# Patient Record
Sex: FEMALE | Race: BLACK OR AFRICAN AMERICAN | Employment: OTHER | URBAN - METROPOLITAN AREA
[De-identification: names, ages, dates, MRNs, and addresses within clinical notes are randomized per-mention and may not be internally consistent; named-entity substitution may affect disease eponyms.]

---

## 2017-01-03 ENCOUNTER — HOSPITAL ENCOUNTER (EMERGENCY)
Age: 67
Discharge: HOME | End: 2017-01-03
Attending: EMERGENCY MEDICINE
Payer: MEDICARE

## 2017-01-03 ENCOUNTER — APPOINTMENT (OUTPATIENT)
Dept: GENERAL RADIOLOGY | Age: 67
End: 2017-01-03
Attending: PHYSICIAN ASSISTANT
Payer: MEDICARE

## 2017-01-03 VITALS
HEIGHT: 66 IN | RESPIRATION RATE: 21 BRPM | WEIGHT: 185 LBS | TEMPERATURE: 98.3 F | BODY MASS INDEX: 29.73 KG/M2 | OXYGEN SATURATION: 99 % | HEART RATE: 65 BPM | SYSTOLIC BLOOD PRESSURE: 131 MMHG | DIASTOLIC BLOOD PRESSURE: 70 MMHG

## 2017-01-03 DIAGNOSIS — S70.11XA CONTUSION OF RIGHT HIP AND THIGH, INITIAL ENCOUNTER: Primary | ICD-10-CM

## 2017-01-03 DIAGNOSIS — S40.011A CONTUSION OF RIGHT SHOULDER, INITIAL ENCOUNTER: ICD-10-CM

## 2017-01-03 DIAGNOSIS — S70.01XA CONTUSION OF RIGHT HIP AND THIGH, INITIAL ENCOUNTER: Primary | ICD-10-CM

## 2017-01-03 DIAGNOSIS — S16.1XXA NECK STRAIN, INITIAL ENCOUNTER: ICD-10-CM
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
## 2017-01-03 DIAGNOSIS — S39.012A BACK STRAIN, INITIAL ENCOUNTER: ICD-10-CM

## 2017-01-03 PROCEDURE — 74011250637 HC RX REV CODE- 250/637: Performed by: PHYSICIAN ASSISTANT

## 2017-01-03 PROCEDURE — 72100 X-RAY EXAM L-S SPINE 2/3 VWS: CPT

## 2017-01-03 PROCEDURE — 73030 X-RAY EXAM OF SHOULDER: CPT

## 2017-01-03 PROCEDURE — 73502 X-RAY EXAM HIP UNI 2-3 VIEWS: CPT

## 2017-01-03 PROCEDURE — 72040 X-RAY EXAM NECK SPINE 2-3 VW: CPT

## 2017-01-03 PROCEDURE — 99282 EMERGENCY DEPT VISIT SF MDM: CPT

## 2017-01-03 RX ORDER — TRAMADOL HYDROCHLORIDE 50 MG/1
50 TABLET ORAL
Qty: 20 TAB | Refills: 0 | Status: SHIPPED | OUTPATIENT
Start: 2017-01-03 | End: 2019-06-01

## 2017-01-03 RX ORDER — TRAMADOL HYDROCHLORIDE 50 MG/1
50 TABLET ORAL
Status: COMPLETED | OUTPATIENT
Start: 2017-01-03 | End: 2017-01-03

## 2017-01-03 RX ADMIN — TRAMADOL HYDROCHLORIDE 50 MG: 50 TABLET, FILM COATED ORAL at 16:58

## 2017-01-03 NOTE — DISCHARGE INSTRUCTIONS
Neck Strain: Care Instructions  Your Care Instructions  You have strained the muscles and ligaments in your neck. A sudden, awkward movement can strain the neck. This often occurs with falls or car accidents or during certain sports. Everyday activities like working on a computer or sleeping can also cause neck strain if they force you to hold your neck in an awkward position for a long time. It is common for neck pain to get worse for a day or two after an injury, but it should start to feel better after that. You may have more pain and stiffness for several days before it gets better. This is expected. It may take a few weeks or longer for it to heal completely. Good home treatment can help you get better faster and avoid future neck problems. Follow-up care is a key part of your treatment and safety. Be sure to make and go to all appointments, and call your doctor if you are having problems. It's also a good idea to know your test results and keep a list of the medicines you take. How can you care for yourself at home? · If you were given a neck brace (cervical collar) to limit neck motion, wear it as instructed for as many days as your doctor tells you to. Do not wear it longer than you were told to. Wearing a brace for too long can make neck stiffness worse and weaken the neck muscles. · You can try using heat or ice to see if it helps. ¨ Try using a heating pad on a low or medium setting for 15 to 20 minutes every 2 to 3 hours. Try a warm shower in place of one session with the heating pad. You can also buy single-use heat wraps that last up to 8 hours. ¨ You can also try an ice pack for 10 to 15 minutes every 2 to 3 hours. · Take pain medicines exactly as directed. ¨ If the doctor gave you a prescription medicine for pain, take it as prescribed. ¨ If you are not taking a prescription pain medicine, ask your doctor if you can take an over-the-counter medicine.   · Gently rub the area to relieve pain and help with blood flow. Do not massage the area if it hurts to do so. · Do not do anything that makes the pain worse. Take it easy for a couple of days. You can do your usual activities if they do not hurt your neck or put it at risk for more stress or injury. · Try sleeping on a special neck pillow. Place it under your neck, not under your head. Placing a tightly rolled-up towel under your neck while you sleep will also work. If you use a neck pillow or rolled towel, do not use your regular pillow at the same time. · To prevent future neck pain, do exercises to stretch and strengthen your neck and back. Learn how to use good posture, safe lifting techniques, and proper body mechanics. When should you call for help? Call 911 anytime you think you may need emergency care. For example, call if:  · You are unable to move an arm or a leg at all. Call your doctor now or seek immediate medical care if:  · You have new or worse symptoms in your arms, legs, chest, belly, or buttocks. Symptoms may include:  ¨ Numbness or tingling. ¨ Weakness. ¨ Pain. · You lose bladder or bowel control. Watch closely for changes in your health, and be sure to contact your doctor if:  · You are not getting better as expected. Where can you learn more? Go to http://thai-lesly.info/. Enter M253 in the search box to learn more about \"Neck Strain: Care Instructions. \"  Current as of: May 23, 2016  Content Version: 11.1  © 20062415-1090 [employer]. Care instructions adapted under license by [employer] (which disclaims liability or warranty for this information). If you have questions about a medical condition or this instruction, always ask your healthcare professional. [address] any warranty or liability for your use of this information.          Neck Strain or Sprain: Rehab Exercises  Your Care Instructions  Here are some examples of typical rehabilitation exercises for your condition. Start each exercise slowly. Ease off the exercise if you start to have pain. Your doctor or physical therapist will tell you when you can start these exercises and which ones will work best for you. How to do the exercises  Neck rotation    1. Sit in a firm chair, or stand up straight. 2. Keeping your chin level, turn your head to the right, and hold for 15 to 30 seconds. 3. Turn your head to the left and hold for 15 to 30 seconds. 4. Repeat 2 to 4 times to each side. Neck stretches    1. Look straight ahead, and tip your right ear to your right shoulder. Do not let your left shoulder rise up as you tip your head to the right. 2. Hold for 15 to 30 seconds. 3. Tilt your head to the left. Do not let your right shoulder rise up as you tip your head to the left. 4. Hold for 15 to 30 seconds. 5. Repeat 2 to 4 times to each side. Forward neck flexion    1. Sit in a firm chair, or stand up straight. 2. Bend your head forward. 3. Hold for 15 to 30 seconds. 4. Repeat 2 to 4 times. Lateral (side) bend strengthening    1. With your right hand, place your first two fingers on your right temple. 2. Start to bend your head to the side while using gentle pressure from your fingers to keep your head from bending. 3. Hold for about 6 seconds. 4. Repeat 8 to 12 times. 5. Switch hands and repeat the same exercise on your left side. Forward bend strengthening    1. Place your first two fingers of either hand on your forehead. 2. Start to bend your head forward while using gentle pressure from your fingers to keep your head from bending. 3. Hold for about 6 seconds. 4. Repeat 8 to 12 times. Neutral position strengthening    1. Using one hand, place your fingertips on the back of your head at the top of your neck. 2. Start to bend your head backward while using gentle pressure from your fingers to keep your head from bending. 3. Hold for about 6 seconds.   4. Repeat 8 to 12 times.  Chin tuck    1. Lie on the floor with a rolled-up towel under your neck. Your head should be touching the floor. 2. Slowly bring your chin toward your chest.  3. Hold for a count of 6, and then relax for up to 10 seconds. 4. Repeat 8 to 12 times. Follow-up care is a key part of your treatment and safety. Be sure to make and go to all appointments, and call your doctor if you are having problems. It's also a good idea to know your test results and keep a list of the medicines you take. Where can you learn more? Go to http://thai-lesly.info/. Enter M679 in the search box to learn more about \"Neck Strain or Sprain: Rehab Exercises. \"  Current as of: May 23, 2016  Content Version: 11.1  © 0078-3274 [employer]. Care instructions adapted under license by [employer] (which disclaims liability or warranty for this information). If you have questions about a medical condition or this instruction, always ask your healthcare professional. [address] any warranty or liability for your use of this information.

## 2017-01-03 NOTE — ED PROVIDER NOTES
HPI Comments: 76yoF to ED c/o right hip, right shoulder, low back, and neck pain s/p slipping on water and falling onto her right side. Pt states she was walking in a car shop and slipped on water which she did not see. Reports majority of the pain is in the right hip, which radiates down her right leg. Denies LOC, dizziness, paresthesias, CP or any other complaints nor injuries. Patient is a 77 y.o. female presenting with fall, back pain, and shoulder pain. The history is provided by the patient. Fall     Back Pain      Shoulder Pain           Past Medical History:   Diagnosis Date    Hip pain, left posterior        Past Surgical History:   Procedure Laterality Date    Hx heart catheterization  3/23/2009    Hx hysterectomy  1997         Family History:   Problem Relation Age of Onset    Diabetes Father     Cancer Father      stomach       Social History     Social History    Marital status:      Spouse name: N/A    Number of children: N/A    Years of education: N/A     Occupational History    Not on file. Social History Main Topics    Smoking status: Never Smoker    Smokeless tobacco: Not on file    Alcohol use No    Drug use: No    Sexual activity: Not on file     Other Topics Concern    Not on file     Social History Narrative         ALLERGIES: Codeine; Morphine; and Sulfa (sulfonamide antibiotics)    Review of Systems   Respiratory: Negative. Cardiovascular: Negative. Gastrointestinal: Negative. Genitourinary: Negative. Musculoskeletal: Positive for arthralgias, back pain, myalgias and neck pain. Negative for joint swelling. Neurological: Negative. All other systems reviewed and are negative. Vitals:    01/03/17 1619   BP: 131/70   Pulse: 65   Resp: 21   Temp: 98.3 °F (36.8 °C)   SpO2: 99%   Weight: 83.9 kg (185 lb)   Height: 5' 6\" (1.676 m)            Physical Exam   Constitutional: She is oriented to person, place, and time.  She appears well-developed and well-nourished. No distress. HENT:   Head: Normocephalic and atraumatic. Right Ear: Hearing and external ear normal.   Left Ear: Hearing and external ear normal.   Nose: Nose normal.   Mouth/Throat: Uvula is midline, oropharynx is clear and moist and mucous membranes are normal.   Eyes: Conjunctivae and EOM are normal. Pupils are equal, round, and reactive to light. Neck: Normal range of motion and full passive range of motion without pain. Muscular tenderness present. No spinous process tenderness present. No rigidity. No edema, no erythema and normal range of motion present. Musculoskeletal:        Right shoulder: She exhibits tenderness and pain. She exhibits normal range of motion, no bony tenderness, no swelling, no effusion, no crepitus, no deformity, no laceration, no spasm, normal pulse and normal strength. Right hip: She exhibits tenderness. She exhibits normal range of motion, normal strength, no bony tenderness, no swelling, no crepitus, no deformity and no laceration. Cervical back: She exhibits pain. She exhibits normal range of motion, no tenderness, no bony tenderness, no swelling, no edema, no deformity, no laceration and no spasm. Thoracic back: Normal.        Lumbar back: She exhibits decreased range of motion, tenderness (right paralumbar muscle tenderness) and pain. She exhibits no bony tenderness, no swelling, no edema, no deformity and no laceration. Lymphadenopathy:     She has no cervical adenopathy. Neurological: She is alert and oriented to person, place, and time. She has normal strength. No sensory deficit. She exhibits normal muscle tone. Coordination and gait normal. GCS eye subscore is 4. GCS verbal subscore is 5. GCS motor subscore is 6. Cranial nerves grossly intact   Skin: Skin is warm and dry. She is not diaphoretic. Psychiatric: She has a normal mood and affect.         MDM  Number of Diagnoses or Management Options  Back strain, initial encounter:   Contusion of right hip and thigh, initial encounter:   Contusion of right shoulder, initial encounter:   Fall from slipping on wet surface, initial encounter:   Neck strain, initial encounter:   Diagnosis management comments: Ddx: fx, sprain, strain, contusion. 66yoF slipped on wet floor and fell, landing on her right side. Xrays of right shoulder, right hip, C spine, and L spine are w/o acute process. Pt likely bruised those areas. Advised she may have increased soreness over the next few days and to use heat, rest at home, PCP f/u. Pt voiced understanding.  DAVID Issa 5:55 PM         Amount and/or Complexity of Data Reviewed  Tests in the radiology section of CPT®: ordered and reviewed    Risk of Complications, Morbidity, and/or Mortality  Presenting problems: moderate  Diagnostic procedures: moderate  Management options: low    Patient Progress  Patient progress: stable    ED Course       Procedures

## 2017-04-21 ENCOUNTER — HOSPITAL ENCOUNTER (OUTPATIENT)
Dept: PHYSICAL THERAPY | Age: 67
Discharge: HOME | End: 2017-04-21
Payer: MEDICARE

## 2017-04-21 PROCEDURE — G8979 MOBILITY GOAL STATUS: HCPCS

## 2017-04-21 PROCEDURE — 97110 THERAPEUTIC EXERCISES: CPT

## 2017-04-21 PROCEDURE — 97161 PT EVAL LOW COMPLEX 20 MIN: CPT

## 2017-04-21 PROCEDURE — G8978 MOBILITY CURRENT STATUS: HCPCS

## 2017-04-21 PROCEDURE — 97140 MANUAL THERAPY 1/> REGIONS: CPT

## 2017-04-21 NOTE — PROGRESS NOTES
[facility] - [facility]              [address]        [place], [address] [place]   [phone] fax    Plan of Care/ Statement of Necessity for Physical Therapy Services    Patient name: Delma Nguyễn Start of Care: 2017   Referral source: Tameka Palm MD : 1950    Medical Diagnosis: Lumbar pain [M54.5]  Sacroiliac pain [M53.3]   Onset Date:2017    Treatment Diagnosis: Lumbar Radiculopathy    Prior Hospitalization: see medical history Provider#: [id number]   Medications: Verified on Patient summary List    Comorbidities: Back pain   Prior Level of Function: Pt is retired; (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 The Plan of Care and following information is based on the information from the initial evaluation. Assessment/ key information: Pt is a 78 y/o female who c/o R side low back pain that radiates down R buttock, and back of thigh to knee. Pt reports pain started after a fall where she slipped on wet floor back in January. Pt reports her pain increases when she sits for too long or walks for long periods of time. Pt describes the pain as throbbing. Pt reports she has had X-rays, MRI, and EMG/NCV. Pt denies any previous back issues like this, just some normal aches/pains. Pt ambulates into clinic with normal gait. Pt shifts frequently in sitting position. Pt is TTP R L/S paraspinals, QL, and with sacral springing. Pt's L/S AROM: flex limited 50% with increase in pain, ext [facility] [place] but slight increase in pain, R SB limited 10% with increase in pain, L SB WFL but increase in pain, B rot WFL no increase in pain. Pt's B LE MMT: hip flex L 3+/5 R 3-/5, hip abd B 4-/5, hip ext L 3+/5 R 3-/5, knee ext 4/5, knee flex 4/5. Pt presents with (-) Slump B but (+) SLR and 90/90 B. Pt demos bridge through 50% range no increase in pain. Pt reports a relief with prone on elbow and no increase in pain with prone press ups.  Patient will benefit from skilled PT services to modify and progress therapeutic interventions, address functional mobility deficits, address ROM deficits, address strength deficits, analyze and address soft tissue restrictions, analyze and cue movement patterns, assess and modify postural abnormalities, address imbalance/dizziness and instruct in home and community integration to attain remaining goals. Evaluation Complexity History MEDIUM  Complexity : 1-2 comorbidities / personal factors will impact the outcome/ POC ; Examination MEDIUM Complexity : 3 Standardized tests and measures addressing body structure, function, activity limitation and / or participation in recreation  ;Presentation LOW Complexity : Stable, uncomplicated  ;Clinical Decision Making MEDIUM Complexity : FOTO score of 26-74  Overall Complexity Rating: LOW   Problem List: pain affecting function, decrease ROM, decrease strength, impaired gait/ balance, decrease ADL/ functional abilitiies, decrease activity tolerance, decrease flexibility/ joint mobility and decrease transfer abilities   Treatment Plan may include any combination of the following: Therapeutic exercise, Therapeutic activities, Neuromuscular re-education, Physical agent/modality, Gait/balance training, Manual therapy and Patient education  Patient / Family readiness to learn indicated by: asking questions, trying to perform skills and interest  Persons(s) to be included in education: patient (P)  Barriers to Learning/Limitations: None  Patient Goal (s): Relief of pain  Patient Self Reported Health Status: good  Rehabilitation Potential: good    Short Term Goals: To be accomplished in 2 weeks:  1) Pt will report (I) and compliance with HEP for home management of symptoms. 2) Pt will present with (-) SLR B for in order to perform ADL's. Long Term Goals: To be accomplished in 6 weeks:  1) Pt's FOTO score will improve > or = 60 indicating improvements in function.   2) Pt will improve L/S AROM flex, ext, B SB WFL w/o an increase in pain in order to perform ADL's.  3) Pt will improve B hip MMT > or = 4/5 for functional strength during ADL's.  4) Pt will demo bridge through full range w/o pain indicating improved core/glute strength. 5) Pt will report > or = 60% improvement in symptoms in order to progress rehab. Frequency / Duration: Patient to be seen 2 times per week for 6 weeks. Patient/ Caregiver education and instruction: Diagnosis, prognosis, exercises   [x]  Plan of care has been reviewed with PTA    G-Codes (GP)  Mobility   Current  CK= 40-59%   Goal  CJ= 20-39%    The severity rating is based on clinical judgment and the FOTO score. Certification Period: 4/21/2017-7/19/2017  Citlalli Mcnally, PT 4/21/2017 11:50 AM  ________________________________________________________________________    I certify that the above Therapy Services are being furnished while the patient is under my care. I agree with the treatment plan and certify that this therapy is necessary.     [de-identified] Signature:____________________  Date:____________Time: _________    Please sign and return to [facility] - [address], [address] [place]   [phone] fax

## 2017-04-21 NOTE — PROGRESS NOTES
PT DAILY TREATMENT NOTE - [facility]     Patient Name: Yoana Carreno  Date:2017  : 1950  [x]  Patient  Verified  [place]or: [place] MED[place] / Plan: [place] MEDICARE PART A & B / Product Type: Medicare /    In time:11:05  Out time:11:40  Total Treatment Time (min): 35  Total Timed Codes (min): 23  1:1 Treatment Time ( [address] only): 35   Visit #: 1 of 12    Treatment Area: Lumbar pain [M54.5]  Sacroiliac pain [M53.3]    SUBJECTIVE  Pain Level (0-10 scale): 3-4  Any medication changes, allergies to medications, adverse drug reactions, diagnosis change, or new procedure performed?: [x] No    [] Yes (see summary sheet for update)  Subjective functional status/changes:   [] No changes reported  Pt is a 80 y/o female who c/o R side low back pain that radiates down R buttock, and back of thigh to knee. Pt reports pain started after a fall where she slipped on wet floor back in January. Pt reports her pain increases when she sits for too long or walks for long periods of time. Pt describes the pain as throbbing. Pt reports she has had X-rays, MRI, and EMG/NCV. Pt denies any previous back issues like this, just some normal aches/pains.      OBJECTIVE    Modality rationale:  to improve the patients ability to    Min Type Additional Details    [] Estim:  []Unatt       []IFC  []Premod                        []Other:  []w/ice   []w/heat  Position:  Location:    [] Estim: []Att    []TENS instruct  []NMES                    []Other:  []w/US   []w/ice   []w/heat  Position:  Location:    []  Traction: [] Cervical       []Lumbar                       [] Prone          []Supine                       []Intermittent   []Continuous Lbs:  [] before manual  [] after manual    []  Ultrasound: []Continuous   [] Pulsed                           []1MHz   []3MHz W/cm2:  Location:    []  Iontophoresis with dexamethasone         Location: [] Take home patch   [] In clinic    []  Ice     []  heat  []  Ice massage  []  Laser   []  Anodyne Position:  Location:    []  Laser with stim  []  Other:  Position:  Location:    []  Vasopneumatic Device Pressure:       [] lo [] med [] hi   Temperature: [] lo [] med [] hi   [] Skin assessment post-treatment:  []intact []redness- no adverse reaction    []redness - adverse reaction:     12 min [x]Eval                  []Re-Eval       13 min Therapeutic Exercise:  [x] See flow sheet : Instructed, demonstrated, and performed HEP   Rationale: increase ROM, increase strength and improve coordination to improve the patients ability to decrease pain and perform ADL's     min Therapeutic Activity:  []  See flow sheet :   Rationale:   to improve the patients ability to       min Neuromuscular Re-education:  []  See flow sheet :   Rationale:   to improve the patients ability to     10 min Manual Therapy:  STM/DTM R L/S paraspinals, QL, glutes, piriformis. L/S PA and unilateral mobs. Sacral springing. Rationale: decrease pain, increase ROM, increase tissue extensibility and decrease trigger points to increase ease with ADL's     min Gait Training:  ___ feet with ___ device on level surfaces with ___ level of assist   Rationale: With   [] TE   [] TA   [] neuro   [] other: Patient Education: [x] Review HEP    [] Progressed/Changed HEP based on:   [] positioning   [] body mechanics   [] transfers   [] heat/ice application    [] other:      Other Objective/Functional Measures: Pt ambulates into clinic with normal gait. Pt shifts frequently in sitting position. Pt is TTP R L/S paraspinals, QL, and with sacral springing. Pt's L/S AROM: flex limited 50% with increase in pain, ext [facility] [place] but slight increase in pain, R SB limited 10% with increase in pain, L SB WFL but increase in pain, B rot WFL no increase in pain. Pt's B LE MMT: hip flex L 3+/5 R 3-/5, hip abd B 4-/5, hip ext L 3+/5 R 3-/5, knee ext 4/5, knee flex 4/5. Pt presents with (-) Slump B but (+) SLR and 90/90 B.  Pt demos bridge through 50% range no increase in pain. Pt reports a relief with prone on elbow and no increase in pain with prone press ups. Pain Level (0-10 scale) post treatment: 4    ASSESSMENT/Changes in Function: see POC    Patient will continue to benefit from skilled PT services to modify and progress therapeutic interventions, address functional mobility deficits, address ROM deficits, address strength deficits, analyze and address soft tissue restrictions, analyze and cue movement patterns, assess and modify postural abnormalities, address imbalance/dizziness and instruct in home and community integration to attain remaining goals. [x]  See Plan of Care  []  See progress note/recertification  []  See Discharge Summary         Progress towards goals / Updated goals:  Short term goals: to be accomplished in 2 weeks  1) Pt will report (I) and compliance with HEP for home management of symptoms. At eval: Instructed, demonstrated, and performed HEP  2) Pt will present with (-) SLR B for in order to perform ADL's. At eval: (+) SLR B  Long term goals: to be accomplished in 6 weeks  1) Pt's FOTO score will improve > or = 60 indicating improvements in function. At eval: FOTO = 44  2) Pt will improve L/S AROM flex, ext, B SB WFL w/o an increase in pain in order to perform ADL's. At eval: flex limited 50% with increase in pain, ext [facility] but slight increase in pain, R SB limited 10% with increase in pain, L SB WFL but increase in pain  3) Pt will improve B hip MMT > or = 4/5 for functional strength during ADL's. At eval: hip flex L 3+/5 R 3-/5, hip abd B 4-/5, hip ext L 3+/5 R 3-/5  4) Pt will demo bridge through full range w/o pain indicating improved core/glute strength. At eval: pt demos bridge through 50% range, no increase in pain  5) Pt will report > or = 60% improvement in symptoms in order to progress rehab.   At eval: 0%    PLAN  []  Upgrade activities as tolerated     []  Continue plan of care  []  Update interventions per flow sheet       [] Discharge due to:_  [x]  Other: 2x/week for 6 weeks      Nicola Shannon, PT 4/21/2017  11:50 AM    Future Appointments  Date Time Provider Luis Robin   5/2/2017 4:00 PM Tyrone De Oliveira [facility]   5/5/2017 10:00 AM Marika An, PTA [facility]   5/9/2017 12:00 PM Marika An, PTA [facility]   5/11/2017 1:30 PM Nasir Jaramillo  [address]   5/12/2017 10:30 AM Marika An, PTA [facility]   5/16/2017 11:00 AM Marika An, PTA [facility]   5/19/2017 11:30 AM Marika An, PTA [facility]   5/23/2017 11:00 AM Marika An, PTA [facility]   5/26/2017 9:00 AM Nicola Shannon PT [facility]   5/30/2017 9:30 AM Marika An, PTA [facility]   6/2/2017 9:30 AM Marika An, PTA [facility]

## 2017-04-26 ENCOUNTER — HOSPITAL ENCOUNTER (OUTPATIENT)
Dept: PHYSICAL THERAPY | Age: 67
Discharge: HOME | End: 2017-04-26
Payer: MEDICARE

## 2017-04-26 PROCEDURE — G8985 CARRY GOAL STATUS: HCPCS

## 2017-04-26 PROCEDURE — 97110 THERAPEUTIC EXERCISES: CPT

## 2017-04-26 PROCEDURE — 97161 PT EVAL LOW COMPLEX 20 MIN: CPT

## 2017-04-26 PROCEDURE — G8984 CARRY CURRENT STATUS: HCPCS

## 2017-04-26 PROCEDURE — 97140 MANUAL THERAPY 1/> REGIONS: CPT

## 2017-04-26 NOTE — PROGRESS NOTES
PT DAILY TREATMENT NOTE - [facility]     Patient Name: Mustapha Rockwell  Date:2017  : 1950  [x]  Patient  Verified  Payor: [place] MED[place]ARE / Plan: [place] MEDICARE PART A & B / Product Type: Medicare /    In time: 2:03  Out time:2:38  Total Treatment Time (min): 35  Total Timed Codes (min): 23  1:1 Treatment Time ( only): 35   Visit #: 1 of 12    Treatment Area: Radiculopathy, cervical region [M54.12]  Sprain of shoulder, right [S43.401A]    SUBJECTIVE  Pain Level (0-10 scale): 3  Any medication changes, allergies to medications, adverse drug reactions, diagnosis change, or new procedure performed?: [x] No    [] Yes (see summary sheet for update)  Subjective functional status/changes:   [] No changes reported  Pt is a 80 y/o female who c/o R side neck pain, R shoulder blade pain, with radicular symptoms down R UE. Pt reports pain started after a fall where she slipped on wet floor back in January. Pt reports her pain increases with prolonged driving and at night time when she tries to sleep. Pt reports numbness is in B thumbs and is pretty constant.  Pt reports she has had an MRI and MD thinks it is a pinched nerve but she has been referred to a spine specialist.     OBJECTIVE    Modality rationale:  to improve the patients ability to    Min Type Additional Details    [] Estim:  []Unatt       []IFC  []Premod                        []Other:  []w/ice   []w/heat  Position:  Location:    [] Estim: []Att    []TENS instruct  []NMES                    []Other:  []w/US   []w/ice   []w/heat  Position:  Location:    []  Traction: [] Cervical       []Lumbar                       [] Prone          []Supine                       []Intermittent   []Continuous Lbs:  [] before manual  [] after manual    []  Ultrasound: []Continuous   [] Pulsed                           []1MHz   []3MHz W/cm2:  Location:    []  Iontophoresis with dexamethasone         Location: [] Take home patch   [] In clinic    []  Ice     []  heat  [] Ice massage  []  Laser   []  Anodyne Position:  Location:    []  Laser with stim  []  Other:  Position:  Location:    []  Vasopneumatic Device Pressure:       [] lo [] med [] hi   Temperature: [] lo [] med [] hi   [] Skin assessment post-treatment:  []intact []redness- no adverse reaction    []redness - adverse reaction:     12 min [x]Eval                  []Re-Eval       13 min Therapeutic Exercise:  [x] See flow sheet : Instructed, demonstrated, and performed HEP   Rationale: increase ROM, increase strength and improve coordination to improve the patients ability to decrease pain and perform ADL's     min Therapeutic Activity:  []  See flow sheet :   Rationale:   to improve the patients ability to       min Neuromuscular Re-education:  []  See flow sheet :   Rationale:   to improve the patients ability to     10 min Manual Therapy:  STM/DTM R C/S paraspinals, R UT, R levator, R rhomboids. SOR. R UT S with OP. R STJ mobs. Rationale: decrease pain, increase ROM, increase tissue extensibility and decrease trigger points to increase ease with ADL's     min Gait Training:  ___ feet with ___ device on level surfaces with ___ level of assist   Rationale: With   [] TE   [] TA   [] neuro   [] other: Patient Education: [x] Review HEP    [] Progressed/Changed HEP based on:   [] positioning   [] body mechanics   [] transfers   [] heat/ice application    [] other:      Other Objective/Functional Measures: Pt sits with forward shoulder posture. Pt is TTP R UT, R levator, R parascapular mm. Pt's C/S AROM: flex 35 deg with pain, ext 30 deg no pain, R SB 18 deg with pain, L SB 20 deg no pain, R rot 38 deg with pain on R, L rot 40 deg with pain on R. Pt presents with (-) Cervical Compression/Distraction. Pt's R shoulder AROM: flex/scap 90 deg with pain in R UT and R parascapular mm, ER fingertip to R UT with pain in shoulder blade, IR WNL no increase in pain.       Pain Level (0-10 scale) post treatment: 3    ASSESSMENT/Changes in Function: see POC    Patient will continue to benefit from skilled PT services to modify and progress therapeutic interventions, address functional mobility deficits, address ROM deficits, address strength deficits, analyze and address soft tissue restrictions, analyze and cue movement patterns, assess and modify postural abnormalities and instruct in home and community integration to attain remaining goals. [x]  See Plan of Care  []  See progress note/recertification  []  See Discharge Summary         Progress towards goals / Updated goals:  Short term goals: to be accomplished in 2 weeks  1) Pt will report (I) and compliance with HEP for home management of symptoms. At eval: Instructed, demonstrated, and performed HEP  2) Pt will improve C/S AROM all planes 5-8 deg for ease with driving. At eval: flex 35 deg with pain, ext 30 deg no pain, R SB 18 deg with pain, L SB 20 deg no pain, R rot 38 deg with pain on R, L rot 40 deg with pain on R  Long term goals: to be accomplished in 6 weeks  1) Pt will improve Neck FOTO score > or = 69 indicating improvements in function. At eval: Neck FOTO = 52  2) Pt will improve C/S AROM all planes 10-15 deg for ease with driving and ADL's. At eval: flex 35 deg with pain, ext 30 deg no pain, R SB 18 deg with pain, L SB 20 deg no pain, R rot 38 deg with pain on R, L rot 40 deg with pain on R  3) Pt will improve R shoulder AROM flex/scap WFL w/o pain in order to perform ADL's. At eval: flex/scap 90 deg with pain in R UT and R parascapular mm  4) Pt will report > or = 60% improvement in symptoms in order to progress rehab.   At eval: 0%    PLAN  []  Upgrade activities as tolerated     []  Continue plan of care  []  Update interventions per flow sheet       []  Discharge due to:_  [x]  Other: 2x/week for 6 weeks      Gaby Hutson PT 4/26/2017  2:34 PM    Future Appointments  Date Time Provider Luis Robin   5/2/2017 3:30 PM [facility]   5/2/2017 4:00 PM Sarahy Ellison, PT [facility]   5/5/2017 10:00 AM Archie Zaldivar, PTA [facility]   5/5/2017 10:30 AM Archie [facility]aldivar, PTA [facility]   5/9/2017 12:00 PM Archie [facility]aldivar, [facility]   5/9/2017 1:00 PM Nahomy Noland, PTA [facility]   5/11/2017 1:30 PM Suzy Chen  [address]   5/12/2017 10:30 AM Archie Zaldivar, PTA [facility]   5/12/2017 11:00 AM Nahomy Noland, PTA [facility]   5/16/2017 11:00 AM Archie Zaldivar, PTA [facility]   5/16/2017 12:00 PM Archie Zaldivar, PTA [facility]   5/19/2017 11:30 AM Archie Zaldivar, PTA [facility]   5/23/2017 11:00 AM Archie Zaldivar, PTA [facility]   5/23/2017 11:30 AM Archie Zaldivar, PTA [facility]   5/26/2017 8:30 AM Sarahy Ellison, PT [facility]   5/26/2017 9:00 AM Sarahy Ellison, PT [facility]   5/30/2017 9:30 AM Archie Zaldivar, PTA [facility]   5/30/2017 10:00 AM Archie Zaldivar, PTA [facility]   6/2/2017 9:00 AM Nahomy Noland, MIRI [facility]   6/2/2017 9:30 AM Nahomy Noland PTA [facility]

## 2017-04-26 NOTE — PROGRESS NOTES
[facility] - [facility]              [address]        [place], [address] [place]   [phone] fax    Plan of Care/ Statement of Necessity for Physical Therapy Services    Patient name: Elvi Carrillo Start of Care: 2017   Referral source: Gabriel Castro MD : 1950    Medical Diagnosis: Radiculopathy, cervical region [M54.12]  Sprain of shoulder, right [S43.401A]   Onset Date:2017    Treatment Diagnosis: Cervical/Parascapular pain   Prior Hospitalization: see medical history Provider#: [id number]   Medications: Verified on Patient summary List    Comorbidities: BMI over 30   Prior Level of Function: Pt is retired teacher; (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 No previous h/o neck/arm pain. The Plan of Care and following information is based on the information from the initial evaluation. Assessment/ key information: Pt is a 80 y/o female who c/o R side neck pain, R shoulder blade pain, with radicular symptoms down R UE. Pt reports pain started after a fall where she slipped on wet floor back in January. Pt reports her pain increases with prolonged driving and at night time when she tries to sleep. Pt reports numbness is in B thumbs and is pretty constant. Pt reports she has had an MRI and MD thinks it is a pinched nerve but she has been referred to a spine specialist. Pt sits with forward shoulder posture. Pt is TTP R UT, R levator, R parascapular mm. Pt's C/S AROM: flex 35 deg with pain, ext 30 deg no pain, R SB 18 deg with pain, L SB 20 deg no pain, R rot 38 deg with pain on R, L rot 40 deg with pain on R. Pt presents with (-) Cervical Compression/Distraction. Pt's R shoulder AROM: flex/scap 90 deg with pain in R UT and R parascapular mm, ER fingertip to R UT with pain in shoulder blade, IR WNL no increase in pain.  Patient will benefit from skilled PT services to modify and progress therapeutic interventions, address functional mobility deficits, address ROM deficits, address strength deficits, analyze and address soft tissue restrictions, analyze and cue movement patterns, assess and modify postural abnormalities and instruct in home and community integration to attain remaining goals. Evaluation Complexity History LOW Complexity : Zero comorbidities / personal factors that will impact the outcome / POC; Examination MEDIUM Complexity : 3 Standardized tests and measures addressing body structure, function, activity limitation and / or participation in recreation  ;Presentation LOW Complexity : Stable, uncomplicated  ;Clinical Decision Making MEDIUM Complexity : FOTO score of 26-74  Overall Complexity Rating: LOW   Problem List: pain affecting function, decrease ROM, decrease strength, decrease ADL/ functional abilitiies, decrease activity tolerance and decrease flexibility/ joint mobility   Treatment Plan may include any combination of the following: Therapeutic exercise, Therapeutic activities, Neuromuscular re-education, Physical agent/modality, Manual therapy and Patient education  Patient / Family readiness to learn indicated by: asking questions, trying to perform skills and interest  Persons(s) to be included in education: patient (P)  Barriers to Learning/Limitations: None  Patient Goal (s): Pain relief  Patient Self Reported Health Status: good  Rehabilitation Potential: good    Short Term Goals: To be accomplished in 2 weeks:  1) Pt will report (I) and compliance with HEP for home management of symptoms. 2) Pt will improve C/S AROM all planes 5-8 deg for ease with driving. Long Term Goals: To be accomplished in 6 weeks:  1) Pt will improve Neck FOTO score > or = 69 indicating improvements in function.   2) Pt will improve C/S AROM all planes 10-15 deg for ease with driving and ADL's.  3) Pt will improve R shoulder AROM flex/scap WFL w/o pain in order to perform ADL's.  4) Pt will report > or = 60% improvement in symptoms in order to progress rehab.    Frequency / Duration: Patient to be seen 2 times per week for 6 weeks. Patient/ Caregiver education and instruction: Diagnosis, prognosis, exercises   [x]  Plan of care has been reviewed with PTA    G-Codes (GP)  Carry   Current  CK= 40-59%    Goal  CJ= 20-39%    The severity rating is based on clinical judgment and the FOTO score. Certification Period: 4/26/2017-7/24/2017  Rosangela Robbins, PT 4/26/2017 2:34 PM  ________________________________________________________________________    I certify that the above Therapy Services are being furnished while the patient is under my care. I agree with the treatment plan and certify that this therapy is necessary.     [de-identified] Signature:____________________  Date:____________Time: _________    Please sign and return to [facility] - [address] [place]   [phone] fax

## 2017-05-02 ENCOUNTER — HOSPITAL ENCOUNTER (OUTPATIENT)
Dept: PHYSICAL THERAPY | Age: 67
Discharge: HOME | End: 2017-05-02
Payer: MEDICARE

## 2017-05-02 PROCEDURE — 97140 MANUAL THERAPY 1/> REGIONS: CPT

## 2017-05-02 PROCEDURE — 97110 THERAPEUTIC EXERCISES: CPT

## 2017-05-02 NOTE — PROGRESS NOTES
PT DAILY TREATMENT NOTE - [facility]     Patient Name: Yoana Carreno  Date:2017  : 1950  [x]  Patient  Verified  Payor: Valorie Torrez / Plan: [place] MEDICARE PART A & B / Product Type: Medicare /    In time:4:11  Out time:4:56  Total Treatment Time (min): 45  Total Timed Codes (min): 35  1:1 Treatment Time ( only): 35   Visit #: 2 of 12    Treatment Area: Lumbar pain [M54.5]  Sacroiliac pain [M53.3]    SUBJECTIVE  Pain Level (0-10 scale): 2  Any medication changes, allergies to medications, adverse drug reactions, diagnosis change, or new procedure performed?: [x] No    [] Yes (see summary sheet for update)  Subjective functional status/changes:   [] No changes reported  Pt reports compliance with HEP.     OBJECTIVE    Modality rationale: decrease pain and increase tissue extensibility to improve the patients ability to perform ADL's   Min Type Additional Details    [] Estim:  []Unatt       []IFC  []Premod                        []Other:  []w/ice   []w/heat  Position:  Location:    [] Estim: []Att    []TENS instruct  []NMES                    []Other:  []w/US   []w/ice   []w/heat  Position:  Location:    []  Traction: [] Cervical       []Lumbar                       [] Prone          []Supine                       []Intermittent   []Continuous Lbs:  [] before manual  [] after manual    []  Ultrasound: []Continuous   [] Pulsed                           []1MHz   []3MHz W/cm2:  Location:    []  Iontophoresis with dexamethasone         Location: [] Take home patch   [] In clinic   10 [x]  Ice     []  heat  []  Ice massage  []  Laser   []  Anodyne Position: supine  Location: neck and back    []  Laser with stim  []  Other:  Position:  Location:    []  Vasopneumatic Device Pressure:       [] lo [] med [] hi   Temperature: [] lo [] med [] hi   [] Skin assessment post-treatment:  []intact []redness- no adverse reaction    []redness - adverse reaction:      min []Eval                  []Re-Eval       25 min Therapeutic Exercise:  [x] See flow sheet :   Rationale: increase ROM, increase strength and improve coordination to improve the patients ability to decrease pain and perform ADL's     min Therapeutic Activity:  []  See flow sheet :   Rationale:   to improve the patients ability to       min Neuromuscular Re-education:  []  See flow sheet :   Rationale:   to improve the patients ability to     10 min Manual Therapy:  Per flow sheet   Rationale: decrease pain, increase ROM, increase tissue extensibility and decrease trigger points to increase ease with ADL's     min Gait Training:  ___ feet with ___ device on level surfaces with ___ level of assist   Rationale: With   [] TE   [] TA   [] neuro   [] other: Patient Education: [x] Review HEP    [] Progressed/Changed HEP based on:   [] positioning   [] body mechanics   [] transfers   [] heat/ice application    [] other:      Other Objective/Functional Measures:      Pain Level (0-10 scale) post treatment: 2    ASSESSMENT/Changes in Function: cont per POC    Patient will continue to benefit from skilled PT services to modify and progress therapeutic interventions, address functional mobility deficits, address ROM deficits, address strength deficits, analyze and address soft tissue restrictions, analyze and cue movement patterns, assess and modify postural abnormalities, address imbalance/dizziness and instruct in home and community integration to attain remaining goals. []  See Plan of Care  []  See progress note/recertification  []  See Discharge Summary         Progress towards goals / Updated goals:  Short term goals: to be accomplished in 2 weeks  1) Pt will report (I) and compliance with HEP for home management of symptoms. At [facility]: Instructed, demonstrated, and performed HEP  Current: Goal met per pt report (5/2/17)  2) Pt will present with (-) SLR B for in order to perform ADL's.   At eval: (+) SLR B  Long term goals: to be accomplished in 6 weeks  1) Pt's FOTO score will improve > or = 60 indicating improvements in function. At eval: FOTO = 44  2) Pt will improve L/S AROM flex, ext, B SB WFL w/o an increase in pain in order to perform ADL's. At eval: flex limited 50% with increase in pain, [facility] but slight increase in pain, R SB limited 10% with increase in pain, L SB WFL but increase in pain  3) Pt will improve B hip MMT > or = 4/5 for functional strength during ADL's. At eval: hip flex L 3+/5 R 3-/5, hip abd B 4-/5, hip ext L 3+/5 R 3-/5  4) Pt will demo bridge through full range w/o pain indicating improved core/glute strength. At eval: pt demos bridge through 50% range, no increase in pain  5) Pt will report > or = 60% improvement in symptoms in order to progress rehab.   At eval: 0%    PLAN  [x]  Upgrade activities as tolerated     [x]  Continue plan of care  []  Update interventions per flow sheet       []  Discharge due to:_  []  Other:_      Chloé Joseph, PT 5/2/2017  3:42 PM    Future Appointments  Date Time Provider Luis Robin   5/2/2017 4:00 PM Tyrone Higgins [facility]   5/5/2017 10:00 AM MIRI Navarro [facility]   5/5/2017 10:30 AM MIRI Navarro [facility]   5/9/2017 12:00 PM MIRI Navarro [facility]   5/9/2017 1:00 PM [facility]I Correia [facility]   5/11/2017 1:30 PM Nasir Jaramillo  [address]   5/12/2017 10:30 AM MIRI Navarro [facility]   5/12/2017 11:00 AM MIRI Correia [facility]   5/16/2017 11:00 AM Amol Rodriguez, PTA [facility]   5/16/2017 12:00 PM Amol Rodriguez, PTA [facility]   5/17/2017 8:30 AM Amol Rodriguez, PTA [facility]   5/17/2017 9:00 AM Amol Rodriguez, PTA [facility]   5/23/2017 11:00 AM Amol Rodriguez, PTA [facility]   5/23/2017 11:30 AM Amol Rodriguez, PTA [facility]   5/26/2017 8:30 AM Amol Rodriguez, PTA [facility]   5/26/2017 9:00 AM Rocio Galindo, PT [facility]   5/30/2017 9:30 AM Amol Rodriguez, PTA [facility]   5/30/2017 10:00 AM Merry Desir Ary Knapp, MIRI [facility]   6/2/2017 9:00 AM Nahomy Noland, MIRI [facility]   6/2/2017 9:30 AM Nahomy Noland, MIRI [facility]

## 2017-05-02 NOTE — PROGRESS NOTES
PT DAILY TREATMENT NOTE - [facility]     Patient Name: Rosa M Durbin  Date:2017  : 1950  [x]  Patient  Verified  Payor: [place] MEDICARE / Plan: [place] MEDICARE PART A & B / Product Type: Medicare /    In time: 3:33  Out time:4:11  Total Treatment Time (min): 38  Total Timed Codes (min): 38  1:1 Treatment Time ( only): 45   Visit #: 2 of 12    Treatment Area: Radiculopathy, cervical region [M54.12]  Sprain of shoulder, right [S43.401A]    SUBJECTIVE  Pain Level (0-10 scale): 3-4  Any medication changes, allergies to medications, adverse drug reactions, diagnosis change, or new procedure performed?: [x] No    [] Yes (see summary sheet for update)  Subjective functional status/changes:   [] No changes reported  Pt reports compliance with HEP    OBJECTIVE    Modality rationale:  to improve the patients ability to    Min Type Additional Details    [] Estim:  []Unatt       []IFC  []Premod                        []Other:  []w/ice   []w/heat  Position:  Location:    [] Estim: []Att    []TENS instruct  []NMES                    []Other:  []w/US   []w/ice   []w/heat  Position:  Location:    []  Traction: [] Cervical       []Lumbar                       [] Prone          []Supine                       []Intermittent   []Continuous Lbs:  [] before manual  [] after manual    []  Ultrasound: []Continuous   [] Pulsed                           []1MHz   []3MHz W/cm2:  Location:    []  Iontophoresis with dexamethasone         Location: [] Take home patch   [] In clinic    []  Ice     []  heat  []  Ice massage  []  Laser   []  Anodyne Position:  Location:    []  Laser with stim  []  Other:  Position:  Location:    []  Vasopneumatic Device Pressure:       [] lo [] med [] hi   Temperature: [] lo [] med [] hi   [] Skin assessment post-treatment:  []intact []redness- no adverse reaction    []redness - adverse reaction:      min []Eval                  []Re-Eval       30 min Therapeutic Exercise:  [x] See flow sheet : Rationale: increase ROM, increase strength and improve coordination to improve the patients ability to decrease pain and perform ADL's     min Therapeutic Activity:  []  See flow sheet :   Rationale:   to improve the patients ability to       min Neuromuscular Re-education:  []  See flow sheet :   Rationale:   to improve the patients ability to     8 min Manual Therapy:  Per flow sheet   Rationale: decrease pain, increase ROM, increase tissue extensibility and decrease trigger points to increase ease with ADL's     min Gait Training:  ___ feet with ___ device on level surfaces with ___ level of assist   Rationale: With   [] TE   [] TA   [] neuro   [] other: Patient Education: [x] Review HEP    [] Progressed/Changed HEP based on:   [] positioning   [] body mechanics   [] transfers   [] heat/ice application    [] other:      Other Objective/Functional Measures:      Pain Level (0-10 scale) post treatment: 2    ASSESSMENT/Changes in Function: cont per POC    Patient will continue to benefit from skilled PT services to modify and progress therapeutic interventions, address functional mobility deficits, address ROM deficits, address strength deficits, analyze and address soft tissue restrictions, analyze and cue movement patterns, assess and modify postural abnormalities and instruct in home and community integration to attain remaining goals. []  See Plan of Care  []  See progress note/recertification  []  See Discharge Summary         Progress towards goals / Updated goals:  Short term goals: to be accomplished in 2 weeks  1) Pt will report (I) and compliance with HEP for home management of symptoms. At eval: Instructed, demonstrated, and performed HEP  Current: Goal met per pt report (5/2/17)  2) Pt will improve C/S AROM all planes 5-8 deg for ease with driving.   At eval: flex 35 deg with pain, ext 30 deg no pain, R SB 18 deg with pain, L SB 20 deg no pain, R rot 38 deg with pain on R, L rot 40 deg with pain on R  Long term goals: to be accomplished in 6 weeks  1) Pt will improve Neck FOTO score > or = 69 indicating improvements in function. At eval: Neck FOTO = 52  2) Pt will improve C/S AROM all planes 10-15 deg for ease with driving and ADL's. At eval: flex 35 deg with pain, ext 30 deg no pain, R SB 18 deg with pain, L SB 20 deg no pain, R rot 38 deg with pain on R, L rot 40 deg with pain on R  3) Pt will improve R shoulder AROM flex/scap WFL w/o pain in order to perform ADL's. At eval: flex/scap 90 deg with pain in R UT and R parascapular mm  4) Pt will report > or = 60% improvement in symptoms in order to progress rehab.   At eval: 0%    PLAN  [x]  Upgrade activities as tolerated     [x]  Continue plan of care  []  Update interventions per flow sheet       []  Discharge due to:_  []  Other:_      Renea Vivas PT 5/2/2017  3:40 PM    Future Appointments  Date Time Provider Luis Robin   5/2/2017 4:00 PM Tyrone Feliciano [facility]   5/5/2017 10:00 AM Frances Ortega PTA [facility]   5/5/2017 10:30 AM Frances Ortega, MIRI [facility]   5/9/2017 12:00 PM Frances Ortega PTA [facility]   5/9/2017 1:00 PM Nahomy Noland PTA [facility]   5/11/2017 1:30 PM Tonya Calvert  [id number]    5/12/2017 10:30 AM Frances Ortega PTA [facility]   5/12/2017 11:00 AM Nahomy Noland PTA [facility]   5/16/2017 11:00 AM Frances Ortega PTA [facility]   5/16/2017 12:00 PM Dimple Jordan, PTA [facility]   5/17/2017 8:30 AM Dimple Ortega, PTA [facility]   5/17/2017 9:00 AM Dimple Ortega, PTA [facility]   5/23/2017 11:00 AM Dimple Ortega, PTA [facility]   5/23/2017 11:30 AM Dimple Ortega, PTA [facility]   5/26/2017 8:30 AM Dimple Ortega, PTA [facility]   5/26/2017 9:00 AM Mauro Doty, PT [facility]   5/30/2017 9:30 AM Frances Ortega, PTA [facility]   5/30/2017 10:00 AM Frances Ortega, PTA [facility]   6/2/2017 9:00 AM Nahomy Noland, PTA [facility]   6/2/2017 9:30 AM Nahomy Noland, PTA [facility]

## 2017-05-05 ENCOUNTER — APPOINTMENT (OUTPATIENT)
Dept: PHYSICAL THERAPY | Age: 67
End: 2017-05-05
Payer: MEDICARE

## 2017-05-05 ENCOUNTER — HOSPITAL ENCOUNTER (OUTPATIENT)
Dept: PHYSICAL THERAPY | Age: 67
End: 2017-05-05
Payer: MEDICARE

## 2017-05-09 ENCOUNTER — HOSPITAL ENCOUNTER (OUTPATIENT)
Dept: PHYSICAL THERAPY | Age: 67
Discharge: HOME | End: 2017-05-09
Payer: MEDICARE

## 2017-05-09 PROCEDURE — 97110 THERAPEUTIC EXERCISES: CPT

## 2017-05-09 PROCEDURE — 97140 MANUAL THERAPY 1/> REGIONS: CPT

## 2017-05-09 NOTE — PROGRESS NOTES
PT DAILY TREATMENT NOTE - [facility]     Patient Name: Claudette Fernandez  Date:2017  : 1950  [x]  Patient  Verified  Payor: VA MEDICARE / Plan: VA MEDICARE PART A & B / Product Type: Medicare /    In time: 12:55  Out time: 1:30  Total Treatment Time (min): 35  Total Timed Codes (min): 35  1:1 Treatment Time ( only): 30   Visit #: 3 of 12    Treatment Area: Radiculopathy, cervical region [M54.12]  Sprain of shoulder, right [S43.401A]    SUBJECTIVE  Pain Level (0-10 scale): 2/10  Any medication changes, allergies to medications, adverse drug reactions, diagnosis change, or new procedure performed?: [x] No    [] Yes (see summary sheet for update)  Subjective functional status/changes:   [] No changes reported  Pt states she is sore on the right side.      OBJECTIVE    Modality rationale:    Min Type Additional Details    [] Estim:  []Unatt       []IFC  []Premod                        []Other:  []w/ice   []w/heat  Position:  Location:    [] Estim: []Att    []TENS instruct  []NMES                    []Other:  []w/US   []w/ice   []w/heat  Position:  Location:    []  Traction: [] Cervical       []Lumbar                       [] Prone          []Supine                       []Intermittent   []Continuous Lbs:  [] before manual  [] after manual    []  Ultrasound: []Continuous   [] Pulsed                           []1MHz   []3MHz W/cm2:  Location:    []  Iontophoresis with dexamethasone         Location: [] Take home patch   [] In clinic    []  Ice     []  heat  []  Ice massage  []  Laser   []  Anodyne Position:  Location:    []  Laser with stim  []  Other:  Position:  Location:    []  Vasopneumatic Device Pressure:       [] lo [] med [] hi   Temperature: [] lo [] med [] hi   [] Skin assessment post-treatment:  []intact []redness- no adverse reaction    []redness - adverse reaction:     25 min Therapeutic Exercise:  [x] See flow sheet :   Rationale: increase ROM and increase strength to improve the patients ability to increase ease of ADLs. 10 min Manual Therapy:  Per flow sheet   Rationale: decrease pain, increase ROM, increase tissue extensibility and decrease trigger points to increase ease of ADLs. With   [] TE   [] TA   [] neuro   [] other: Patient Education: [x] Review HEP    [] Progressed/Changed HEP based on:   [] positioning   [] body mechanics   [] transfers   [] heat/ice application    [] other:      Other Objective/Functional Measures: c/s AROM: flex 33, ext 30, R SB 23, L SB 21, R rot 40, L rot 46. Pain Level (0-10 scale) post treatment: 2/10    ASSESSMENT/Changes in Function: Cont per POC. Patient will continue to benefit from skilled PT services to modify and progress therapeutic interventions, address functional mobility deficits, address ROM deficits and address strength deficits to attain remaining goals. []  See Plan of Care  []  See progress note/recertification  []  See Discharge Summary         Progress towards goals / Updated goals:  Short term goals: to be accomplished in 2 weeks  1) Pt will report (I) and compliance with HEP for home management of symptoms. At [facility]: Instructed, demonstrated, and performed HEP  Current: Goal met per pt report (5/2/17)  2) Pt will improve C/S AROM all planes 5-8 deg for ease with driving. At eval: flex 35 deg with pain, ext 30 deg no pain, R SB 18 deg with pain, L SB 20 deg no pain, R rot 38 deg with pain on R, L rot 40 deg with pain on R  Current: Progressing, flex 33, ext 30, R SB 23, L SB 21, R rot 40, L rot 46. 5/9/17    Long term goals: to be accomplished in 6 weeks  1) Pt will improve Neck FOTO score > or = 69 indicating improvements in function. At eval: Neck FOTO = 52  2) Pt will improve C/S AROM all planes 10-15 deg for ease with driving and ADL's.   At eval: flex 35 deg with pain, ext 30 deg no pain, R SB 18 deg with pain, L SB 20 deg no pain, R rot 38 deg with pain on R, L rot 40 deg with pain on R  3) Pt will improve R shoulder AROM flex/scap WFL w/o pain in order to perform ADL's. At eval: flex/scap 90 deg with pain in R UT and R parascapular mm  4) Pt will report > or = 60% improvement in symptoms in order to progress rehab.   At eval: 0%    PLAN  []  Upgrade activities as tolerated     [x]  Continue plan of care  []  Update interventions per flow sheet       []  Discharge due to:_  []  Other:_      Nahomy Noland, PTA 5/9/2017  1:02 PM    Future Appointments  Date Time Provider Luis Robin   5/11/2017 1:30 PM [facility] Uli Jaramillo  [address]   5/12/2017 10:30 AM Verner Seals, PTA [facility]   5/12/2017 11:00 AM Nahomy Noland, PTA [facility]   5/16/2017 11:00 AM Verner Seals, PTA [facility]   5/16/2017 12:00 PM Verner Seals, PTA [facility]   5/17/2017 8:30 AM Verner Seals, PTA [facility]   5/17/2017 9:00 AM Verner Seals, PTA [facility]   5/23/2017 11:00 AM Verner Seals, PTA [facility]   5/23/2017 11:30 AM Verner Seals, PTA [facility]   5/26/2017 8:30 AM Verner Seals, PTA [facility]   5/26/2017 9:00 AM Riki Friends, PT [facility]   5/30/2017 9:30 AM Verner Seals, PTA [facility]   5/30/2017 10:00 AM Verner Seals, PTA [facility]   6/2/2017 9:00 AM Nahomy E Laws, PTA [facility]   6/2/2017 9:30 AM MIRI [facility]

## 2017-05-11 ENCOUNTER — OFFICE VISIT (OUTPATIENT)
Dept: ORTHOPEDIC SURGERY | Age: 67
End: 2017-05-11

## 2017-05-11 VITALS
RESPIRATION RATE: 17 BRPM | TEMPERATURE: 98.2 F | DIASTOLIC BLOOD PRESSURE: 68 MMHG | HEART RATE: 68 BPM | SYSTOLIC BLOOD PRESSURE: 129 MMHG | BODY MASS INDEX: 33.07 KG/M2 | HEIGHT: 66 IN | WEIGHT: 205.8 LBS

## 2017-05-11 DIAGNOSIS — G56.03 BILATERAL CARPAL TUNNEL SYNDROME: ICD-10-CM

## 2017-05-11 DIAGNOSIS — M47.816 SPONDYLOSIS OF LUMBAR REGION WITHOUT MYELOPATHY OR RADICULOPATHY: ICD-10-CM

## 2017-05-11 DIAGNOSIS — M48.02 CERVICAL STENOSIS OF SPINE: Primary | ICD-10-CM

## 2017-05-11 RX ORDER — DULOXETIN HYDROCHLORIDE 30 MG/1
CAPSULE, DELAYED RELEASE ORAL
Qty: 30 CAP | Refills: 1 | Status: SHIPPED | OUTPATIENT
Start: 2017-05-11 | End: 2019-06-01

## 2017-05-11 RX ORDER — METHYLPREDNISOLONE 4 MG/1
TABLET ORAL
Qty: 1 DOSE PACK | Refills: 0 | Status: SHIPPED | OUTPATIENT
Start: 2017-05-11 | End: 2018-07-30

## 2017-05-11 NOTE — PROGRESS NOTES
[address]  [address], [address]  [place] [address]  Phone: [phone]  Fax: [phone]        Raya Easley  : 1950  PCP: Bryson Nowak MD      NEW PATIENT      ASSESSMENT AND PLAN     Angie Pittman was seen today for back pain, neck pain and shoulder pain. 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 Trial of Cymbalta. Will re-eval symptoms, may need RUTHIE, possible sx. 2. Advised to avoid any overhead activity. 3. Recommend pt to wear carpal tunnel brace QHS. 4. Rx for MDP. 5. Given information on cervical radiculopathy. Exacerbation of underlying condition post fall. 6.  Continue HEP. Follow-up Disposition:  Return in about 1 month (around 2017) for med f/u, adjustment. CHIEF COMPLAINT  Nelson Savage is seen today in consultation at the request of Dr. Ana Mccormick for complaints of neck and back pain x 4 months. HISTORY OF PRESENT ILLNESS  Nelson Savage is a 79 y.o. female. RHD. Pt notes that she slipped and fell on her RT side which caused her pain. Pt was walking on a wet floor when taking her car to get serviced at a car dealership. She has pain radiating into her BUE. She has paresthesias in her BUE, R>L. Pt has been noticing a loss of dexterity in her hands. She has constant numbness in her RT thumb. She admits to nocturnal paresthesia that interrupts her sleep at night. Pt notes that she has been noticing weakness in her RUE getting worse. She has difficulty doing buttons and admits to clumsiness in her hands. Pt will drop objects with her hands. She is more bothered by her RUE symptoms.  Pt denies any neck or RUE issues before her fall. She has been in PT w/o significant improvement. Pt also c/o back pain that radiates into her RLE. Pt notes that she has been increased neck pain since her fall that has not been settling down. She denies any weakness in her BLE. She denies any paresthesia in her BLE. No saddle paresthesia. She has not been tried on antineuritics for her symptoms. She has family member who is on Gabapentin and has negative side effects with it. Pt wishes to not be on Gabapentin. Pt is in physical therapy without much benefit. Denies persistent fevers, chills, weight changes, neurogenic bowel or bladder symptoms. Pt denies recent ED visits or hospitalizations. Pt was working as a  up until she fell. MRI images reviewed with patient, she has chronic appearing multilevel stenosis. Denies myelopathic symptoms prior to fall. Pain Assessment  5/11/2017   Location of Pain Neck;Back; Shoulder;Hip;Hand   Location Modifiers Left;Right   Severity of Pain 4   Quality of Pain Aching   Quality of Pain Comment numbness, tingling   Frequency of Pain Constant   Aggravating Factors Bending;Walking;Standing   Relieving Factors Rest   Relieving Factors Comment tramadol         EMG from 4/4/17 from Dr. Lev Rodriguez Office:  1) right C6 radiculopathy mild to moderate per EMG  2) Right median neuropathy with slowing at the level of the wrist - moderate right carpal tunnel syndrome  3) Right L5-S1 radiculopathy mild per EMG. Lumbar spine MRI from 4/7/17 reviewed:  IMPRESSION:    1. Mild multilevel degenerative disc changes with disc bulging L2-L5, no high-grade stenosis or definite impingement. Mild lateral recess and right subarticular foraminal stenosis L4/5.    2. No acute osseous finding. 3. Incidental L3 vertebral body hemangioma.             Cervical spine MRI from 4/7/17 reviewed:   IMPRESSION:    1.Multilevel degenerative spondylosis and posterior disc protrusions causing severe canal stenosis and cord flattening C4/5 and C5/6. Moderate canal stenosis and cord flattening C3/4, mild to moderate canal stenosis and cord flattening C6/7.    2. Severe multilevel degenerative bilateral foraminal stenosis C3-C7, with moderate bilateral foraminal stenosis C7/T1. 3. No intrinsic cord lesion. XR Results (maximum last 3): Results from [place] encounter on 01/03/17   XR HIP RT W OR WO PELV 2-3 VWS   Narrative Right hip joint, with AP view of pelvis, 2 views:        INDICATION:    Trauma due to fall. Injury to right shoulder, right hip and back. COMPARISON STUDY: Radiographs of right hip joint and pelvis on 06/04/2016. FINDINGS:    There is no evidence of fracture, dislocation or subluxation at right hip joint. There are minimal senescent osteoarthritic changes at both hip joints. There are  findings suggestive of old healed fracture in the inferior aspect of left pubic  bone, with focal sclerotic changes, similar to previous study. There is no  definable acute fracture in hip bones and sacrum. There are moderate spondylitic  changes in lower lumbar spine and lumbosacral junction. Impression IMPRESSION:    No evidence of fracture or alignment of bones at right hip joint or on rest of  AP view of pelvis. XR SPINE CERV PA LAT ODONT 3 V MAX   Narrative Cervical spine, 3 views:        INDICATION:    Trauma due to fall and injury to right shoulder. Neck pain and back pain. COMPARISON STUDY: Radiograph of cervical spine on 11/25/2013. FINDINGS:    There is no evidence of fracture, dislocation or subluxation in cervical spine. Odontoid process is intact. There are findings of moderate to marked degenerative disc disease at C3-C4,  C4-C5, C5-C6 and C6-C7 levels, similar to previous study in 2013. Impression IMPRESSION:    No evidence of fracture or malalignment of bones in cervical spine.     Multilevel moderate-to-severe spondylosis in cervical spine. XR SPINE LUMB 2 OR 3 V   Narrative Lumbar spine, 3 views:        INDICATION:    Trauma due to fall. Injury to right shoulder, back and right lower leg. COMPARISON STUDY: None. FINDINGS:    There is no evidence of fracture, vertebral body compression, subluxation or  dislocation in lumbar spine or in visualized sacrum. There are findings of mild to moderate degenerative disc disease at L5-S1, L4/L5  and L3-L4 levels. Note is also made of moderate facet osteoarthritis at L2/L3,  L3-L4, L4/L5 and L5-S1 levels. The study shows moderate amount of stool and gas scattered in colon. Status post  cholecystectomy. Impression IMPRESSION:    No evidence of fracture or malalignment of bones in lumbar spine and sacrum. Multilevel significant spondylosis including degenerative disc disease and facet  osteoarthritis in lumbar spine and lumbosacral junction. PAST MEDICAL HISTORY   Past Medical History:   Diagnosis Date    Hip pain, left posterior        Past Surgical History:   Procedure Laterality Date    HX HEART CATHETERIZATION  3/23/2009    HX HYSTERECTOMY  1997       MEDICATIONS      Current Outpatient Prescriptions   Medication Sig Dispense Refill    DULoxetine (CYMBALTA) 30 mg capsule Take 1 cap po with dinner 30 Cap 1    methylPREDNISolone (MEDROL DOSEPACK) 4 mg tablet Per dose pack instructions 1 Dose Pack 0    traMADol (ULTRAM) 50 mg tablet Take 1 Tab by mouth every six (6) hours as needed for Pain. Max Daily Amount: 200 mg. 20 Tab 0       ALLERGIES    Allergies   Allergen Reactions    Codeine Itching    Morphine Itching    Sulfa (Sulfonamide Antibiotics) Swelling          SOCIAL HISTORY    Social History     Social History    Marital status:      Spouse name: N/A    Number of children: N/A    Years of education: N/A     Occupational History    Not on file.      Social History Main Topics    Smoking status: Never Smoker    Smokeless tobacco: Not on file    Alcohol use No    Drug use: No    Sexual activity: Not on file     Other Topics Concern    Not on file     Social History Narrative       FAMILY HISTORY    Family History   Problem Relation Age of Onset    Diabetes Father     Cancer Father      stomach         REVIEW OF SYSTEMS  Review of Systems   Constitutional: Negative for chills, fever and weight loss. Respiratory: Negative for shortness of breath. Cardiovascular: Negative for chest pain. Gastrointestinal: Negative for constipation. Negative for fecal incontinence   Genitourinary: Negative for dysuria. Negative for urinary incontinence   Musculoskeletal:        Per HPI   Skin: Negative for rash. Neurological: Positive for tingling and focal weakness. Negative for dizziness, tremors and headaches. Endo/Heme/Allergies: Does not bruise/bleed easily. Psychiatric/Behavioral: The patient does not have insomnia. PHYSICAL EXAMINATION  Visit Vitals    /68 (BP 1 Location: Left arm, BP Patient Position: Sitting)    Pulse 68    Temp 98.2 °F (36.8 °C) (Oral)    Resp 17    Ht 5' 6\" (1.676 m)    Wt 205 lb 12.8 oz (93.4 kg)    BMI 33.22 kg/m2          Accompanied by self. Constitutional:  Well developed, well nourished, in no acute distress. Psychiatric: Affect and mood are appropriate. Integumentary: No rashes or abrasions noted on exposed areas. Cardiovascular/Peripheral Vascular: Intact l pulses. No peripheral edema is noted. Lymphatic:  No evidence of lymphedema. No cervical lymphadenopathy. SPINE/MUSCULOSKELETAL EXAM    Cervical spine:  Neck is midline. Normal muscle tone. No focal atrophy is noted. Limited Cervical extension. Shoulder ROM intact. Trigger points noted right upper trapezius and RT levator scapula. Negative Spurling's sign. Negative Tinel's sign. Negative Sahni's sign. Sensation grossly intact to light touch.      Lumbar spine:  No rash, ecchymosis, or gross obliquity. No fasciculations. No focal atrophy is noted. Tenderness to palpation midline L5-S1. No tenderness to palpation at the sciatic notch. SI joints non-tender. Trochanters non tender. Sensation grossly intact to light touch. MOTOR:      Biceps  Triceps Deltoids Wrist Ext Wrist Flex Hand Intrin   Right +4/5 4/5 +4/5 4/5 +4/5 +4/5   Left +4/5 +4/5 +4/5 +4/5 +4/5 +4/5        Hip Flex  Quads Hamstrings Ankle DF EHL Ankle PF   Right +4/5 +4/5 +4/5 +4/5 +4/5 +4/5   Left +4/5 +4/5 +4/5 +4/5 +4/5 +4/5     DTRs are 2+ biceps, triceps, brachioradialis  DTRs are 1+ patella, and Achilles. Straight Leg raise negative. No difficulty with tandem gait. Heel walk elicits RT buttock pain    Ambulation without assistive device. FWB. Written by Devin Vora, as dictated by Galina Rios MD.    I, Dr. Galina Rios MD, confirm that all documentation is accurate. Ms. Albert Teran may have a reminder for a \"due or due soon\" health maintenance. I have asked that she contact her primary care provider for follow-up on this health maintenance.

## 2017-05-11 NOTE — PATIENT INSTRUCTIONS
Pinched Nerve in the Neck: Care Instructions  Your Care Instructions  A pinched nerve in the neck happens when a vertebra or disc in the upper part of your spine is damaged. This damage can happen because of an injury. Or it can just happen with age. The changes caused by the damage may put pressure on a nearby nerve root, pinching it. This causes symptoms such as sharp pain in your neck, shoulder, arm, or back. You may also have tingling or numbness. Sometimes it makes your arm weaker. The symptoms are usually worse when you turn your head or strain your neck. For many people, the symptoms get better over time and finally go away. Early treatment usually includes medicines for pain and swelling. Sometimes physical therapy and special exercises may help. Follow-up care is a key part of your treatment and safety. Be sure to make and go to all appointments, and call your doctor if you are having problems. It's also a good idea to know your test results and keep a list of the medicines you take. How can you care for yourself at home? · Be safe with medicines. Read and follow all instructions on the label. ¨ If the doctor gave you a prescription medicine for pain, take it as prescribed. ¨ If you are not taking a prescription pain medicine, ask your doctor if you can take an over-the-counter medicine. · Try using a heating pad on a low or medium setting for 15 to 20 minutes every 2 or 3 hours. Try a warm shower in place of one session with the heating pad. You can also buy single-use heat wraps that last up to 8 hours. · You can also try an ice pack for 10 to 15 minutes every 2 to 3 hours. There isn't strong evidence that either heat or ice will help. But you can try them to see if they help you. · Don't spend too long in one position. Take short breaks to move around and change positions. · Wear a seat belt and shoulder harness when you are in a car.   · Sleep with a pillow under your head and neck that keeps your neck straight. · If you were given a neck brace (cervical collar) to limit neck motion, wear it as instructed for as many days as your doctor tells you to. Do not wear it longer than you were told to. Wearing a brace for too long can lead to neck stiffness and can weaken the neck muscles. · Follow your doctor's instructions for gentle neck-stretching exercises. · Do not smoke. Smoking can slow healing of your discs. If you need help quitting, talk to your doctor about stop-smoking programs and medicines. These can increase your chances of quitting for good. · Avoid strenuous work or exercise until your doctor says it is okay. When should you call for help? Call 911 anytime you think you may need emergency care. For example, call if:  · You are unable to move an arm or a leg at all. Call your doctor now or seek immediate medical care if:  · You have new or worse symptoms in your arms, legs, chest, belly, or buttocks. Symptoms may include:  ¨ Numbness or tingling. ¨ Weakness. ¨ Pain. · You lose bladder or bowel control. Watch closely for changes in your health, and be sure to contact your doctor if:  · You are not getting better as expected. Where can you learn more? Go to http://thai-lesly.info/. Enter W622 in the search box to learn more about \"Pinched Nerve in the Neck: Care Instructions. \"  Current as of: May 23, 2016  Content Version: 11.2  © 0179-5209 [employer]. Care instructions adapted under license by [employer] (which disclaims liability or warranty for this information). If you have questions about a medical condition or this instruction, always ask your healthcare professional. [place] [address] any warranty or liability for your use of this information.        Pinched Nerve in the Neck: Care Instructions  Your Care Instructions  A pinched nerve in the neck happens when a vertebra or disc in the upper part of your spine is damaged. This damage can happen because of an injury. Or it can just happen with age. The changes caused by the damage may put pressure on a nearby nerve root, pinching it. This causes symptoms such as sharp pain in your neck, shoulder, arm, or back. You may also have tingling or numbness. Sometimes it makes your arm weaker. The symptoms are usually worse when you turn your head or strain your neck. For many people, the symptoms get better over time and finally go away. Early treatment usually includes medicines for pain and swelling. Sometimes physical therapy and special exercises may help. Follow-up care is a key part of your treatment and safety. Be sure to make and go to all appointments, and call your doctor if you are having problems. It's also a good idea to know your test results and keep a list of the medicines you take. How can you care for yourself at home? · Be safe with medicines. Read and follow all instructions on the label. ¨ If the doctor gave you a prescription medicine for pain, take it as prescribed. ¨ If you are not taking a prescription pain medicine, ask your doctor if you can take an over-the-counter medicine. · Try using a heating pad on a low or medium setting for 15 to 20 minutes every 2 or 3 hours. Try a warm shower in place of one session with the heating pad. You can also buy single-use heat wraps that last up to 8 hours. · You can also try an ice pack for 10 to 15 minutes every 2 to 3 hours. There isn't strong evidence that either heat or ice will help. But you can try them to see if they help you. · Don't spend too long in one position. Take short breaks to move around and change positions. · Wear a seat belt and shoulder harness when you are in a car. · Sleep with a pillow under your head and neck that keeps your neck straight. · If you were given a neck brace (cervical collar) to limit neck motion, wear it as instructed for as many days as your doctor tells you to. Do not wear it longer than you were told to. Wearing a brace for too long can lead to neck stiffness and can weaken the neck muscles. · Follow your doctor's instructions for gentle neck-stretching exercises. · Do not smoke. Smoking can slow healing of your discs. If you need help quitting, talk to your doctor about stop-smoking programs and medicines. These can increase your chances of quitting for good. · Avoid strenuous work or exercise until your doctor says it is okay. When should you call for help? Call 911 anytime you think you may need emergency care. For example, call if:  · You are unable to move an arm or a leg at all. Call your doctor now or seek immediate medical care if:  · You have new or worse symptoms in your arms, legs, chest, belly, or buttocks. Symptoms may include:  ¨ Numbness or tingling. ¨ Weakness. ¨ Pain. · You lose bladder or bowel control. Watch closely for changes in your health, and be sure to contact your doctor if:  · You are not getting better as expected. Where can you learn more? Go to http://thai-lesly.info/. Enter L900 in the search box to learn more about \"Pinched Nerve in the Neck: Care Instructions. \"  Current as of: May 23, 2016  Content Version: 11.2  © 7937-2096 [employer]. Care instructions adapted under license by [employer] (which disclaims liability or warranty for this information). If you have questions about a medical condition or this instruction, always ask your healthcare professional. [place] [address] any warranty or liability for your use of this information. Cervical Myelopathy: Care Instructions  Your Care Instructions    Cervical myelopathy is a problem caused by pressure on your spinal cord. This pressure may come from a bone spur. Or it may come from a herniated disc. In some cases, aging makes the discs and ligaments around the spine thicker.  This can also put pressure on the spinal cord. Cervical myelopathy can cause different symptoms. It may cause numbness and pain. It may also cause weakness in the arms, hands, and legs. For some people, it's hard to walk. Others have neck pain or problems with urination and bowel movements. Treatment depends on the cause and your symptoms. You may need regular checkups or you may need surgery to help take pressure off the nerve. Follow-up care is a key part of your treatment and safety. Be sure to make and go to all appointments, and call your doctor if you are having problems. It's also a good idea to know your test results and keep a list of the medicines you take. How can you care for yourself at home? · Be safe with medicines. Read and follow all instructions on the label. ¨ If the doctor gave you a prescription medicine for pain, take it as prescribed. ¨ If you are not taking a prescription pain medicine, ask your doctor if you can take an over-the-counter medicine. · Do exercises recommended by your doctor or physical therapist. Strong muscles can help you do everyday tasks. · If you have trouble using your hands, put Velcro or snaps on clothes. This can make your clothes easier to get on and off. · If you have trouble walking, you can make life easier and safer. Use rails and nonskid tape at home. Try a cane or walker to get around. · Do not smoke. Smoking can slow bone healing. If you need help quitting, talk to your doctor about stop-smoking programs and medicines. These can increase your chances of quitting for good. · Pace yourself. Everyday activities may take longer than before. Give yourself more time to get things done. This may lower your stress. When should you call for help? Call 911 anytime you think you may need emergency care. For example, call if:  · You are unable to move an arm or a leg at all.   Call your doctor now or seek immediate medical care if:  · You have new or worse symptoms in your arms, legs, chest, belly, or buttocks. Symptoms may include:  ¨ Numbness or tingling. ¨ Weakness. ¨ Pain. · You lose bladder or bowel control. Watch closely for changes in your health, and be sure to contact your doctor if:  · You are not getting better as expected. Where can you learn more? Go to http://thai-lesly.info/. Enter U283 in the search box to learn more about \"Cervical Myelopathy: Care Instructions. \"  Current as of: May 23, 2016  Content Version: 11.2  © 1916-8248 [employer]. Care instructions adapted under license by [employer] (which disclaims liability or warranty for this information). If you have questions about a medical condition or this instruction, always ask your healthcare professional. [address] any warranty or liability for your use of this information. Surgery for Cervical Myelopathy: Before Your Surgery  What is surgery for cervical myelopathy? Surgery for cervical myelopathy (say \"dp-prp-AC-uh-thee\") removes any tissues that are pressing on the spinal cord. Tissues can include bone, ruptured discs, and ligaments. The surgery can be done from the back or the front of the neck. If surgery is done from the front, small pieces of bone or small plates and screws will be used to hold the spine in place after the tissue is removed. This is called fusion. If surgery is done from the back, a fusion may or may not be done at the same time. Your doctor makes a cut (incision) in the skin over the spine where the pressure on the spinal cord is. He or she puts special surgical tools through the incision. Your doctor removes any tissues that are putting pressure on the spinal cord. He or she then closes the incision with stitches. You will have a small scar on your neck or back. It will fade with time. Surgery is done to stop the pressure on the spinal cord. This may help with pain and numbness and may improve movement.  It will also help prevent more damage. Some people notice that their symptoms improve very soon after surgery. But your neck and upper back may feel stiff and sore for several weeks after your surgery. Most people stay overnight in the hospital. You will probably be able to return to work or your normal routine in 4 to 6 weeks. In some cases, the doctor may recommend a rehabilitation program after surgery. This may include physical therapy and home exercises. Follow-up care is a key part of your treatment and safety. Be sure to make and go to all appointments, and call your doctor if you are having problems. It's also a good idea to know your test results and keep a list of the medicines you take. What happens before surgery? Surgery can be stressful. This information will help you understand what you can expect. And it will help you safely prepare for surgery. Preparing for surgery  · Understand exactly what surgery is planned, along with the risks, benefits, and other options. · Tell your doctors ALL the medicines, vitamins, supplements, and herbal remedies you take. Some of these can increase the risk of bleeding or interact with anesthesia. · If you take blood thinners, such as warfarin (Coumadin), clopidogrel (Plavix), or aspirin, be sure to talk to your doctor. He or she will tell you if you should stop taking these medicines before your surgery. Make sure that you understand exactly what your doctor wants you to do. · Your doctor will tell you which medicines to take or stop before your surgery. You may need to stop taking certain medicines a week or more before surgery. So talk to your doctor as soon as you can. · If you have an advance directive, let your doctor know. It may include a living will and a durable power of  for health care. Bring a copy to the hospital. If you don't have one, you may want to prepare one. It lets your doctor and loved ones know your health care wishes.  Doctors advise that everyone prepare these papers before any type of surgery or procedure. What happens on the day of surgery? · Follow the instructions exactly about when to stop eating and drinking. If you don't, your surgery may be canceled. If your doctor told you to take your medicines on the day of surgery, take them with only a sip of water. · Take a bath or shower before you come in for your surgery. Do not apply lotions, perfumes, deodorants, or nail polish. · Do not shave the surgical site yourself. · Take off all jewelry and piercings. And take out contact lenses, if you wear them. At the hospital or surgery center  · Bring a picture ID. · The area for surgery is often marked to make sure there are no errors. · You will be kept comfortable and safe by your anesthesia provider. You will be asleep during the surgery. · The surgery will take about 1 to 2 hours. · When you wake up, you will be lying flat on your back. You may have a plastic or foam collar around your neck. Going home  · Be sure you have someone to drive you home. Anesthesia and pain medicine make it unsafe for you to drive. · You will be given more specific instructions about recovering from your surgery. They will cover things like diet, wound care, follow-up care, driving, and getting back to your normal routine. When should you call your doctor? · You have questions or concerns. · You don't understand how to prepare for your surgery. · You become ill before the surgery (such as fever, flu, or a cold). · You need to reschedule or have changed your mind about having the surgery. Where can you learn more? Go to http://thai-lesly.info/. Enter R136 in the search box to learn more about \"Surgery for Cervical Myelopathy: Before Your Surgery. \"  Current as of: May 23, 2016  Content Version: 11.2  © 9895-8940 [employer].  Care instructions adapted under license by [employer] (which disclaims liability or warranty for this information). If you have questions about a medical condition or this instruction, always ask your healthcare professional. [place] [address] any warranty or liability for your use of this information.

## 2017-05-12 ENCOUNTER — HOSPITAL ENCOUNTER (OUTPATIENT)
Dept: PHYSICAL THERAPY | Age: 67
Discharge: HOME | End: 2017-05-12
Payer: MEDICARE

## 2017-05-12 PROCEDURE — 97110 THERAPEUTIC EXERCISES: CPT

## 2017-05-12 PROCEDURE — 97140 MANUAL THERAPY 1/> REGIONS: CPT

## 2017-05-12 NOTE — PROGRESS NOTES
PT DAILY TREATMENT NOTE - [facility]     Patient Name: Brad Manuel  Date:2017  : 1950  [x]  Patient  Verified  Payor: Farhan Nba / Plan: [place] PART A & B / Product Type: Medicare /    In time:10:  Out time:11:02  Total Treatment Time (min): 34  Total Timed Codes (min): 34  1:1 Treatment Time ( only): 34   Visit #: 4 of 12    Treatment Area: Radiculopathy, cervical region [M54.12]  Sprain of shoulder, right [S43.401A]    SUBJECTIVE  Pain Level (0-10 scale): 2  Any medication changes, allergies to medications, adverse drug reactions, diagnosis change, or new procedure performed?: [x] No    [] Yes (see summary sheet for update)  Subjective functional status/changes:   [] No changes reported  Pt reports her neck is doing much better than her hip today.      OBJECTIVE    Modality rationale:    Min Type Additional Details    [] Estim:  []Unatt       []IFC  []Premod                        []Other:  []w/ice   []w/heat  Position:  Location:    [] Estim: []Att    []TENS instruct  []NMES                    []Other:  []w/US   []w/ice   []w/heat  Position:  Location:    []  Traction: [] Cervical       []Lumbar                       [] Prone          []Supine                       []Intermittent   []Continuous Lbs:  [] before manual  [] after manual    []  Ultrasound: []Continuous   [] Pulsed                           []1MHz   []3MHz W/cm2:  Location:    []  Iontophoresis with dexamethasone         Location: [] Take home patch   [] In clinic    []  Ice     []  heat  []  Ice massage  []  Laser   []  Anodyne Position:  Location:    []  Laser with stim  []  Other:  Position:  Location:    []  Vasopneumatic Device Pressure:       [] lo [] med [] hi   Temperature: [] lo [] med [] hi   [] Skin assessment post-treatment:  []intact []redness- no adverse reaction    []redness - adverse reaction:       26 min Therapeutic Exercise:  [x] See flow sheet :   Rationale: increase ROM and increase strength to improve the patients ability to increase ease with ADLs. 8 min Manual Therapy:  Per flow sheet   Rationale: decrease pain, increase ROM, increase tissue extensibility and decrease trigger points to increase tolerance to activities. With   [] TE   [] TA   [] neuro   [] other: Patient Education: [x] Review HEP    [] Progressed/Changed HEP based on:   [] positioning   [] body mechanics   [] transfers   [] heat/ice application    [] other:      Other Objective/Functional Measures: Pt TTP along R UT. Pain Level (0-10 scale) post treatment: 0    ASSESSMENT/Changes in Function: Some exercises were held due to pt being see for other body part at 11 by another therapist. Continue per POC. Patient will continue to benefit from skilled PT services to modify and progress therapeutic interventions, address functional mobility deficits, address ROM deficits, address strength deficits and analyze and address soft tissue restrictions to attain remaining goals. []  See Plan of Care  []  See progress note/recertification  []  See Discharge Summary         Progress towards goals / Updated goals:  Short term goals: to be accomplished in 2 weeks  1) Pt will report (I) and compliance with HEP for home management of symptoms. At [facility]: Instructed, demonstrated, and performed HEP  Current: Goal met per pt report (5/2/17)  2) Pt will improve C/S AROM all planes 5-8 deg for ease with driving. At eval: flex 35 deg with pain, ext 30 deg no pain, R SB 18 deg with pain, L SB 20 deg no pain, R rot 38 deg with pain on R, L rot 40 deg with pain on R  Current: Progressing, flex 33, ext 30, R SB 23, L SB 21, R rot 40, L rot 46. 5/9/17    Long term goals: to be accomplished in 6 weeks  1) Pt will improve Neck FOTO score > or = 69 indicating improvements in function. At eval: Neck FOTO = 52  2) Pt will improve C/S AROM all planes 10-15 deg for ease with driving and ADL's.   At eval: flex 35 deg with pain, ext 30 deg no pain, R SB 18 deg with pain, L SB 20 deg no pain, R rot 38 deg with pain on R, L rot 40 deg with pain on R  3) Pt will improve R shoulder AROM flex/scap WFL w/o pain in order to perform ADL's. At eval: flex/scap 90 deg with pain in R UT and R parascapular mm  4) Pt will report > or = 60% improvement in symptoms in order to progress rehab.   At eval: 0%    PLAN  []  Upgrade activities as tolerated     [x]  Continue plan of care  []  Update interventions per flow sheet       []  Discharge due to:_  []  Other:_      Yolandacelestine Hackett, PTA 5/12/2017  10:44 AM    Future Appointments  Date Time Provider Luis Robin   5/12/2017 11:00 AM Nahomy Noland, PTA [facility]   5/16/2017 11:00 AM Yolanda Hackett, PTA [facility]   5/16/2017 12:00 PM Yolanda Hackett, PTA [facility]   5/17/2017 8:30 AM Yolanda Hackett, PTA [facility]   5/17/2017 9:00 AM Yolanda Hackett, PTA [facility]   5/23/2017 11:00 AM Yolanda Hackett, PTA [facility]   5/23/2017 11:30 AM Yolanda Hackett, PTA [facility]   5/26/2017 8:30 AM Yolanda Hackett, PTA [facility]   5/26/2017 9:00 AM Oris Fuse, PT [facility]   5/30/2017 9:30 AM Yolanda Hackett, PTA [facility]   5/30/2017 10:00 AM Yolanda Hackett, PTA [facility]   6/2/2017 9:00 AM Nahomy Noland, PTA [facility]   6/2/2017 9:30 AM Nahomy Noland PTA [facility]   6/8/2017 11:45 AM Tonya Hoang  [address]

## 2017-05-12 NOTE — PROGRESS NOTES
PT DAILY TREATMENT NOTE - [facility]     Patient Name: Yoana Carreno  Date:2017  : 1950  [x]  Patient  Verified  Payor: [place] MEDICARE / Plan: [place] MEDICARE PART A & B / Product Type: Medicare /    In time: 11:05  Out time:11:57  Total Treatment Time (min): 52  Total Timed Codes (min): 52  1:1 Treatment Time ( [address] only): 30   Visit #: 4 of 12    Treatment Area: Lumbar pain [M54.5]  Sacroiliac pain [M53.3]    SUBJECTIVE  Pain Level (0-10 scale): 4/10  Any medication changes, allergies to medications, adverse drug reactions, diagnosis change, or new procedure performed?: [x] No    [] Yes (see summary sheet for update)  Subjective functional status/changes:   [] No changes reported  Pt states her back has really been bothering her today. OBJECTIVE    Modality rationale: decrease pain to improve the patients ability to perform ADLs.     Min Type Additional Details    [] Estim:  []Unatt       []IFC  []Premod                        []Other:  []w/ice   []w/heat  Position:  Location:    [] Estim: []Att    []TENS instruct  []NMES                    []Other:  []w/US   []w/ice   []w/heat  Position:  Location:    []  Traction: [] Cervical       []Lumbar                       [] Prone          []Supine                       []Intermittent   []Continuous Lbs:  [] before manual  [] after manual    []  Ultrasound: []Continuous   [] Pulsed                           []1MHz   []3MHz W/cm2:  Location:    []  Iontophoresis with dexamethasone         Location: [] Take home patch   [] In clinic   10 []  Ice     [x]  heat  []  Ice massage  []  Laser   []  Anodyne Position: reclined   Location: low back    []  Laser with stim  []  Other:  Position:  Location:    []  Vasopneumatic Device Pressure:       [] lo [] med [] hi   Temperature: [] lo [] med [] hi   [] Skin assessment post-treatment:  []intact []redness- no adverse reaction    []redness - adverse reaction:     22 min Therapeutic Exercise:  [x] See flow sheet : Rationale: increase strength to improve the patients ability to increase ease of ADLs. 10 min Neuromuscular Re-education:  [x]  See flow sheet : core ex's per flow sheet   Rationale: increase strength  to improve the patients ability to perform ADLs. 10 min Manual Therapy:  Per flow sheet   Rationale: decrease pain, increase ROM and increase tissue extensibility to increase ease of ADLs. With   [] TE   [] TA   [] neuro   [] other: Patient Education: [x] Review HEP    [] Progressed/Changed HEP based on:   [] positioning   [] body mechanics   [] transfers   [] heat/ice application    [] other:      Other Objective/Functional Measures: Pt has poor tolerance to l/s PA mobs. Pain Level (0-10 scale) post treatment: 2/10    ASSESSMENT/Changes in Function: Cont per POC. Patient will continue to benefit from skilled PT services to modify and progress therapeutic interventions, address functional mobility deficits, address ROM deficits and address strength deficits to attain remaining goals. []  See Plan of Care  []  See progress note/recertification  []  See Discharge Summary         Progress towards goals / Updated goals:  Short term goals: to be accomplished in 2 weeks  1) Pt will report (I) and compliance with HEP for home management of symptoms. At eval: Instructed, demonstrated, and performed HEP  Current: Goal met per pt report (5/2/17)  2) Pt will present with (-) SLR B for in order to perform ADL's. At eval: (+) SLR B  Long term goals: to be accomplished in 6 weeks  1) Pt's FOTO score will improve > or = 60 indicating improvements in function. At eval: FOTO = 44  2) Pt will improve L/S AROM flex, ext, B SB WFL w/o an increase in pain in order to perform ADL's.   At eval: flex limited 50% with increase in pain, ext [facility] but slight increase in pain, R SB limited 10% with increase in pain, L SB WFL but increase in pain  Current; Progressing: flex WNL with minimal increase in pain, WNL with minimal increase in pain, R SB WNL without pain, L SB WNL with increase in pain. 5/8/17  3) Pt will improve B hip MMT > or = 4/5 for functional strength during ADL's. At eval: hip flex L 3+/5 R 3-/5, hip abd B 4-/5, hip ext L 3+/5 R 3-/5  4) Pt will demo bridge through full range w/o pain indicating improved core/glute strength. At eval: pt demos bridge through 50% range, no increase in pain  5) Pt will report > or = 60% improvement in symptoms in order to progress rehab.   At eval: 0%    PLAN  []  Upgrade activities as tolerated     [x]  Continue plan of care  []  Update interventions per flow sheet       []  Discharge due to:_  []  Other:_      Nahomy Noland PTA 5/12/2017  11:42 AM    Future Appointments  Date Time Provider Luis Robin   5/16/2017 11:00 AM Akira Ped, PTA [facility]   5/16/2017 12:00 PM Akira Ped, PTA [facility]   5/17/2017 8:30 AM [place] Ped, PTA [facility]   5/17/2017 9:00 AM [place] Ped, PTA [facility]   5/23/2017 11:00 AM [place] Ped, PTA [facility]   5/23/2017 11:30 AM [place] Ped, PTA [facility]   5/26/2017 8:30 AM [place] Ped, PTA [facility]   5/26/2017 9:00 AM Nehemiah Marceloo, PT [facility]   5/30/2017 9:30 AM [place] Ped, PTA [facility]   5/30/2017 10:00 AM [place] Ped, PTA [facility]   6/2/2017 9:00 AM Nahomy Noland PTA [facility]   6/2/2017 9:30 AM Nahomy Noland PTA [facility]   6/8/2017 11:45 AM Tonya Salazar  [address]

## 2017-05-16 ENCOUNTER — HOSPITAL ENCOUNTER (OUTPATIENT)
Dept: PHYSICAL THERAPY | Age: 67
Discharge: HOME | End: 2017-05-16
Payer: MEDICARE

## 2017-05-16 PROCEDURE — 97110 THERAPEUTIC EXERCISES: CPT

## 2017-05-16 PROCEDURE — 97140 MANUAL THERAPY 1/> REGIONS: CPT

## 2017-05-16 NOTE — PROGRESS NOTES
PT DAILY TREATMENT NOTE - [facility]     Patient Name: Raheem Reyes  Date:2017  : 1950  [x]  Patient  Verified  Payor: Kristen Shipley / Plan: [place] MEDICARE PART A & B / Product Type: Medicare /    In time:11:06  Out time:11:50  Total Treatment Time (min): 44  Total Timed Codes (min): 34  1:1 Treatment Time ( [address] only): 34   Visit #: 5 of 12    Treatment Area: Lumbar pain [M54.5]  Sacroiliac pain [M53.3]    SUBJECTIVE  Pain Level (0-10 scale): 2  Any medication changes, allergies to medications, adverse drug reactions, diagnosis change, or new procedure performed?: [x] No    [] Yes (see summary sheet for update)  Subjective functional status/changes:   [] No changes reported  Pt reports she is feeling much better today. OBJECTIVE    Modality rationale: decrease pain to improve the patients ability to decrease difficulty while performing tasks.     Min Type Additional Details    [] Estim:  []Unatt       []IFC  []Premod                        []Other:  []w/ice   []w/heat  Position:  Location:    [] Estim: []Att    []TENS instruct  []NMES                    []Other:  []w/US   []w/ice   []w/heat  Position:  Location:    []  Traction: [] Cervical       []Lumbar                       [] Prone          []Supine                       []Intermittent   []Continuous Lbs:  [] before manual  [] after manual    []  Ultrasound: []Continuous   [] Pulsed                           []1MHz   []3MHz W/cm2:  Location:    []  Iontophoresis with dexamethasone         Location: [] Take home patch   [] In clinic   10 []  Ice     [x]  heat  []  Ice massage  []  Laser   []  Anodyne Position:ittis  Location:    []  Laser with stim  []  Other:  Position:  Location:    []  Vasopneumatic Device Pressure:       [] lo [] med [] hi   Temperature: [] lo [] med [] hi   [] Skin assessment post-treatment:  []intact []redness- no adverse reaction    []redness - adverse reaction:            26 min Therapeutic Exercise: [x] See flow sheet : Rationale: increase ROM and increase strength to improve the patients ability to increase ease with ADLs.    8 min Manual Therapy: Per flow sheet   Rationale: decrease pain, increase ROM, increase tissue extensibility and decrease trigger points to increase tolerance to activities.         With   [] TE   [] TA   [] neuro   [] other: Patient Education: [x] Review HEP    [] Progressed/Changed HEP based on:   [] positioning   [] body mechanics   [] transfers   [] heat/ice application    [] other:      Other Objective/Functional Measures: bridges through full ROM with little increase in pain in R hip. Pain Level (0-10 scale) post treatment: 1    ASSESSMENT/Changes in Function: Continue per POC. Patient will continue to benefit from skilled PT services to modify and progress therapeutic interventions, address functional mobility deficits, address ROM deficits, address strength deficits and analyze and address soft tissue restrictions to attain remaining goals. []  See Plan of Care  []  See progress note/recertification  []  See Discharge Summary         Progress towards goals / Updated goals:  Short term goals: to be accomplished in 2 weeks  1) Pt will report (I) and compliance with HEP for home management of symptoms. At eval: Instructed, demonstrated, and performed HEP  Current: Goal met per pt report (5/2/17)  2) Pt will present with (-) SLR B for in order to perform ADL's. At eval: (+) SLR B  Long term goals: to be accomplished in 6 weeks  1) Pt's FOTO score will improve > or = 60 indicating improvements in function. At eval: FOTO = 44  2) Pt will improve L/S AROM flex, ext, B SB WFL w/o an increase in pain in order to perform ADL's.   At eval: flex limited 50% with increase in pain, ext [facility] but slight increase in pain, R SB limited 10% with increase in pain, L SB WFL but increase in pain  Current; Progressing: flex WNL with minimal increase in pain, WNL with minimal increase in pain, R SB WNL without pain, L SB WNL with increase in pain. 5/8/17  3) Pt will improve B hip MMT > or = 4/5 for functional strength during ADL's. At eval: hip flex L 3+/5 R 3-/5, hip abd B 4-/5, hip ext L 3+/5 R 3-/5  4) Pt will demo bridge through full range w/o pain indicating improved core/glute strength. At eval: pt demos bridge through 50% range, no increase in pain  Current; Progressing: bridges through full ROM with little increase in pain in R hip. 5/16/17  5) Pt will report > or = 60% improvement in symptoms in order to progress rehab.   At eval: 0%    PLAN  []  Upgrade activities as tolerated     [x]  Continue plan of care  []  Update interventions per flow sheet       []  Discharge due to:_  []  Other:_      rAchie Zaldivar PTA 5/16/2017  11:14 AM    Future Appointments  Date Time Provider Luis Robin   5/16/2017 12:00 PM Archie Zaldivar PTA [facility]   5/17/2017 8:30 AM Archie Zaldivar, PTA [facility]   5/17/2017 9:00 AM Archie Zaldivar, PTA [facility]   5/23/2017 11:00 AM Archie Zaldivar, PTA [facility]   5/23/2017 11:30 AM Archie Zaldivar, PTA [facility]   5/26/2017 8:30 AM Archie Zaldivar, PTA [facility]   5/26/2017 9:00 AM Chris Hightower, PT [facility]   5/30/2017 9:30 AM Archie Zaldivar, PTA [facility]   5/30/2017 10:00 AM Archie Zaldivar, PTA [facility]   6/2/2017 9:00 AM Nahomy Noland, PTA [facility]   6/2/2017 9:30 AM Nahomy Noland PTA [facility]   6/8/2017 11:45 AM Tonya Hawthorne  [address]

## 2017-05-16 NOTE — PROGRESS NOTES
PT DAILY TREATMENT NOTE - [facility]     Patient Name: Iza Saha  Date:2017  : 1950  [x]  Patient  Verified  Payor: VA MEDICARE / Plan: VA MEDICARE PART A & B / Product Type: Medicare /    In time:11:50 Out time:12:30  Total Treatment Time (min): 40  Total Timed Codes (min): 40  1:1 Treatment Time ([address] only): 30   Visit #: 5 of 12    Treatment Area: Radiculopathy, cervical region [M54.12]  Sprain of shoulder, right [S43.401A]    SUBJECTIVE  Pain Level (0-10 scale): 2-3  Any medication changes, allergies to medications, adverse drug reactions, diagnosis change, or new procedure performed?: [x] No    [] Yes (see summary sheet for update)  Subjective functional status/changes:   [] No changes reported  Pt reports she is feeling much better.      OBJECTIVE        Min Type Additional Details    [] Estim:  []Unatt       []IFC  []Premod                        []Other:  []w/ice   []w/heat  Position:  Location:    [] Estim: []Att    []TENS instruct  []NMES                    []Other:  []w/US   []w/ice   []w/heat  Position:  Location:    []  Traction: [] Cervical       []Lumbar                       [] Prone          []Supine                       []Intermittent   []Continuous Lbs:  [] before manual  [] after manual    []  Ultrasound: []Continuous   [] Pulsed                           []1MHz   []3MHz W/cm2:  Location:    []  Iontophoresis with dexamethasone         Location: [] Take home patch   [] In clinic    []  Ice     []  heat  []  Ice massage  []  Laser   []  Anodyne Position:  Location:    []  Laser with stim  []  Other:  Position:  Location:    []  Vasopneumatic Device Pressure:       [] lo [] med [] hi   Temperature: [] lo [] med [] hi   [] Skin assessment post-treatment:  []intact []redness- no adverse reaction    []redness - adverse reaction:      32 min Therapeutic Exercise: [x] See flow sheet :   Rationale: increase ROM and increase strength to improve the patients ability to increase ease with ADLs.    8 min Manual Therapy: Per flow sheet   Rationale: decrease pain, increase ROM, increase tissue extensibility and decrease trigger points to increase tolerance to activities.               With   [] TE   [] TA   [] neuro   [] other: Patient Education: [x] Review HEP    [] Progressed/Changed HEP based on:   [] positioning   [] body mechanics   [] transfers   [] heat/ice application    [] other:      Other Objective/Functional Measures: flex 135 deg, scap 125 deg without pain. Pain Level (0-10 scale) post treatment: 1    ASSESSMENT/Changes in Function: Continue per POC. Patient will continue to benefit from skilled PT services to modify and progress therapeutic interventions, address functional mobility deficits, address ROM deficits, address strength deficits and analyze and address soft tissue restrictions to attain remaining goals. []  See Plan of Care  []  See progress note/recertification  []  See Discharge Summary         Progress towards goals / Updated goals:  Short term goals: to be accomplished in 2 weeks  1) Pt will report (I) and compliance with HEP for home management of symptoms. At [facility]: Instructed, demonstrated, and performed HEP  Current: Goal met per pt report (5/2/17)  2) Pt will improve C/S AROM all planes 5-8 deg for ease with driving. At eval: flex 35 deg with pain, ext 30 deg no pain, R SB 18 deg with pain, L SB 20 deg no pain, R rot 38 deg with pain on R, L rot 40 deg with pain on R  Current: Progressing, flex 33, ext 30, R SB 23, L SB 21, R rot 40, L rot 46. 5/9/17   Long term goals: to be accomplished in 6 weeks  1) Pt will improve Neck FOTO score > or = 69 indicating improvements in function. At eval: Neck FOTO = 52  2) Pt will improve C/S AROM all planes 10-15 deg for ease with driving and ADL's.   At eval: flex 35 deg with pain, ext 30 deg no pain, R SB 18 deg with pain, L SB 20 deg no pain, R rot 38 deg with pain on R, L rot 40 deg with pain on R  3) Pt will improve R shoulder AROM flex/scap WFL w/o pain in order to perform ADL's. At eval: flex/scap 90 deg with pain in R UT and R parascapular mm  [place]: Progressing: flex 135 deg, scap 125 deg without pain. 5/16/17  4) Pt will report > or = 60% improvement in symptoms in order to progress rehab.   At eval: 0%    PLAN  []  Upgrade activities as tolerated     [x]  Continue plan of care  []  Update interventions per flow sheet       []  Discharge due to:_  []  Other:_      Alicia Caraballo PTA 5/16/2017  11:16 AM    Future Appointments  Date Time Provider Luis Robin   5/16/2017 12:00 PM Alicia Caraballo PTA [facility]   5/17/2017 8:30 AM Alicia Caraballo, PTA [facility]   5/17/2017 9:00 AM Alicia Caraballo, PTA [facility]   5/23/2017 11:00 AM Alicia Caraballo, PTA [facility]   5/23/2017 11:30 AM Alicia Caraballo, PTA [facility]   5/26/2017 8:30 AM Alicia Caraballo, PTA [facility]   5/26/2017 9:00 AM Gilberto Burrows, PT [facility]   5/30/2017 9:30 AM Alicia Caraballo, PTA [facility]   5/30/2017 10:00 AM Alicia Caraballo, PTA [facility]   6/2/2017 9:00 AM N[facility]omy Noland, [facility]   6/2/2017 9:30 AM Nahomy Noland, PTA [facility]   6/8/2017 11:45 AM Tonya Araujo  [address]

## 2017-05-17 ENCOUNTER — HOSPITAL ENCOUNTER (OUTPATIENT)
Dept: PHYSICAL THERAPY | Age: 67
Discharge: HOME | End: 2017-05-17
Payer: MEDICARE

## 2017-05-17 PROCEDURE — 97140 MANUAL THERAPY 1/> REGIONS: CPT

## 2017-05-17 PROCEDURE — 97110 THERAPEUTIC EXERCISES: CPT

## 2017-05-17 NOTE — PROGRESS NOTES
PT DAILY TREATMENT NOTE - [facility]     Patient Name: Reshma Brady  Date:2017  : 1950  [x]  Patient  Verified  Payor: Sinai John / Plan: [place] MEDICARE PART A & B / Product Type: Medicare /    In time:8:36  Out time:9:20  Total Treatment Time (min): 44  Total Timed Codes (min): 34  1:1 Treatment Time ( only): 34   Visit #: 6 of 12    Treatment Area: Lumbar pain [M54.5]  Sacroiliac pain [M53.3]    SUBJECTIVE  Pain Level (0-10 scale): 0  Any medication changes, allergies to medications, adverse drug reactions, diagnosis change, or new procedure performed?: [x] No    [] Yes (see summary sheet for update)  Subjective functional status/changes:   [] No changes reported  Pt reports she is feeling good today and her back is not bothering her at all. OBJECTIVE    Modality rationale: decrease pain to improve the patients ability to decrease difficulty while performing tasks.     Min Type Additional Details    [] Estim:  []Unatt       []IFC  []Premod                        []Other:  []w/ice   []w/heat  Position:  Location:    [] Estim: []Att    []TENS instruct  []NMES                    []Other:  []w/US   []w/ice   []w/heat  Position:  Location:    []  Traction: [] Cervical       []Lumbar                       [] Prone          []Supine                       []Intermittent   []Continuous Lbs:  [] before manual  [] after manual    []  Ultrasound: []Continuous   [] Pulsed                           []1MHz   []3MHz W/cm2:  Location:    []  Iontophoresis with dexamethasone         Location: [] Take home patch   [] In clinic   10 []  Ice     [x]  heat  []  Ice massage  []  Laser   []  Anodyne Position:sitting  Location:neck and back    []  Laser with stim  []  Other:  Position:  Location:    []  Vasopneumatic Device Pressure:       [] lo [] med [] hi   Temperature: [] lo [] med [] hi   [] Skin assessment post-treatment:  []intact []redness- no adverse reaction    []redness - adverse reaction:         26 min Therapeutic Exercise: [x] See flow sheet :   Rationale: increase ROM and increase strength to improve the patients ability to increase ease with ADLs.     8 min Manual Therapy: Per flow sheet   Rationale: decrease pain, increase ROM, increase tissue extensibility and decrease trigger points to increase tolerance to activities.               With   [] TE   [] TA   [] neuro   [] other: Patient Education: [x] Review HEP    [] Progressed/Changed HEP based on:   [] positioning   [] body mechanics   [] transfers   [] heat/ice application    [] other:      Other Objective/Functional Measures: FOTO = 63, increased by 19 since SOC. Pain Level (0-10 scale) post treatment: 0    ASSESSMENT/Changes in Function: Continue per POC. Patient will continue to benefit from skilled PT services to modify and progress therapeutic interventions, address functional mobility deficits, address ROM deficits, address strength deficits and analyze and address soft tissue restrictions to attain remaining goals. []  See Plan of Care  []  See progress note/recertification  []  See Discharge Summary         Progress towards goals / Updated goals:  Short term goals: to be accomplished in 2 weeks  1) Pt will report (I) and compliance with HEP for home management of symptoms. At [facility]: Instructed, demonstrated, and performed HEP  Current: Goal met per pt report (5/2/17)  2) Pt will present with (-) SLR B for in order to perform ADL's. At [facility]: (+) SLR B  Long term goals: to be accomplished in 6 weeks  1) Pt's FOTO score will improve > or = 60 indicating improvements in function. At [facility]: FOTO = 44   Current: Goal met: FOTO = 63, increased by 19 since [facility]. 5/17/17  2) Pt will improve L/S AROM flex, ext, B SB WFL w/o an increase in pain in order to perform ADL's.   At [facility]: flex limited 50% with increase in pain, ext [facility] but slight increase in pain, R SB limited 10% with increase in pain, L SB WFL but increase in pain  Current; Progressing: flex WNL with minimal increase in pain, WNL with minimal increase in pain, R SB WNL without pain, L SB WNL with increase in pain. 5/8/17  3) Pt will improve B hip MMT > or = 4/5 for functional strength during ADL's. At eval: hip flex L 3+/5 R 3-/5, hip abd B 4-/5, hip ext L 3+/5 R 3-/5  4) Pt will demo bridge through full range w/o pain indicating improved core/glute strength. At eval: pt demos bridge through 50% range, no increase in pain  Current; Progressing: bridges through full ROM with little increase in pain in R hip. 5/16/17  5) Pt will report > or = 60% improvement in symptoms in order to progress rehab.   At eval: 0%    PLAN  []  Upgrade activities as tolerated     [x]  Continue plan of care  []  Update interventions per flow sheet       []  Discharge due to:_  []  Other:_      Verner Seals, PTA 5/17/2017  8:39 AM    Future Appointments  Date Time Provider Luis Robin   5/17/2017 9:00 AM Verner Seals, PTA [facility]   5/23/2017 11:00 AM Verner Seals, PTA [facility]   5/23/2017 11:30 AM Verner Seals, PTA [facility]   5/26/2017 8:30 AM Verner Seals, PTA [facility]   5/26/2017 9:00 AM Riki Perez, PT [facility]   5/30/2017 9:30 AM Verner Seals, PTA [facility]   5/30/2017 10:00 AM Verner Seals, PTA [facility]   6/2/2017 9:00 AM Nahomy Noland, PTA [facility]   6/2/2017 9:30 AM Nahomy Noland, PTA [facility]   6/8/2017 11:45 AM Tonya Anderson  [address]

## 2017-05-17 NOTE — PROGRESS NOTES
PT DAILY TREATMENT NOTE - [facility]     Patient Name: Yoana Carreno  Date:2017  : 1950  [x]  Patient  Verified  Payor: Valorie Torrez / Plan: [place] MEDIC[place] & B / Product Type: Medicare /    In time:9:20  Out time:10:02  Total Treatment Time (min): 42  Total Timed Codes (min): 42  1:1 Treatment Time ([facility] only): 30   Visit #: 6 of 12    Treatment Area: Radiculopathy, cervical region [M54.12]  Sprain of shoulder, right [S43.401A]    SUBJECTIVE  Pain Level (0-10 scale): 2  Any medication changes, allergies to medications, adverse drug reactions, diagnosis change, or new procedure performed?: [x] No    [] Yes (see summary sheet for update)  Subjective functional status/changes:   [] No changes reported  Pt reports feeling good after yesterdays session.      OBJECTIVE        Min Type Additional Details    [] Estim:  []Unatt       []IFC  []Premod                        []Other:  []w/ice   []w/heat  Position:  Location:    [] Estim: []Att    []TENS instruct  []NMES                    []Other:  []w/US   []w/ice   []w/heat  Position:  Location:    []  Traction: [] Cervical       []Lumbar                       [] Prone          []Supine                       []Intermittent   []Continuous Lbs:  [] before manual  [] after manual    []  Ultrasound: []Continuous   [] Pulsed                           []1MHz   []3MHz W/cm2:  Location:    []  Iontophoresis with dexamethasone         Location: [] Take home patch   [] In clinic    []  Ice     []  heat  []  Ice massage  []  Laser   []  Anodyne Position:  Location:    []  Laser with stim  []  Other:  Position:  Location:    []  Vasopneumatic Device Pressure:       [] lo [] med [] hi   Temperature: [] lo [] med [] hi   [] Skin assessment post-treatment:  []intact []redness- no adverse reaction    []redness - adverse reaction:   34 min Therapeutic Exercise: [x] See flow sheet :   Rationale: increase ROM and increase strength to improve the patients ability to increase ease with ADLs.     8 min Manual Therapy: Per flow sheet   Rationale: decrease pain, increase ROM, increase tissue extensibility and decrease trigger points to increase tolerance to activities.               With   [] TE   [] TA   [] neuro   [] other: Patient Education: [x] Review HEP    [] Progressed/Changed HEP based on:   [] positioning   [] body mechanics   [] transfers   [] heat/ice application    [] other:      Other Objective/Functional Measures: Pt TTP along R UT. Pain Level (0-10 scale) post treatment: 0    ASSESSMENT/Changes in Function: Continue per PCO. Patient will continue to benefit from skilled PT services to modify and progress therapeutic interventions, address functional mobility deficits, address ROM deficits, address strength deficits and analyze and address soft tissue restrictions to attain remaining goals. []  See Plan of Care  []  See progress note/recertification  []  See Discharge Summary         Progress towards goals / Updated goals:  Short term goals: to be accomplished in 2 weeks  1) Pt will report (I) and compliance with HEP for home management of symptoms. At [facility]: Instructed, demonstrated, and performed HEP  Current: Goal met per pt report (5/2/17)  2) Pt will improve C/S AROM all planes 5-8 deg for ease with driving. At eval: flex 35 deg with pain, ext 30 deg no pain, R SB 18 deg with pain, L SB 20 deg no pain, R rot 38 deg with pain on R, L rot 40 deg with pain on R  Current: Progressing, flex 33, ext 30, R SB 23, L SB 21, R rot 40, L rot 46. 5/9/17   Long term goals: to be accomplished in 6 weeks  1) Pt will improve Neck FOTO score > or = 69 indicating improvements in function. At eval: Neck FOTO = 52  2) Pt will improve C/S AROM all planes 10-15 deg for ease with driving and ADL's.   At eval: flex 35 deg with pain, ext 30 deg no pain, R SB 18 deg with pain, L SB 20 deg no pain, R rot 38 deg with pain on R, L rot 40 deg with pain on R  3) Pt will improve R shoulder AROM flex/scap WFL w/o pain in order to perform ADL's. At eval: flex/scap 90 deg with pain in R UT and R parascapular mm  Curretn: Progressing: flex 135 deg, scap 125 deg without pain. 5/16/17  4) Pt will report > or = 60% improvement in symptoms in order to progress rehab.   At eval: 0%    PLAN  []  Upgrade activities as tolerated     [x]  Continue plan of care  []  Update interventions per flow sheet       []  Discharge due to:_  []  Other:_      Francisco Mercedes PTA 5/17/2017  8:41 AM    Future Appointments  Date Time Provider Luis Robin   5/17/2017 9:00 AM Francisco Mercedes PTA [facility]   5/23/2017 11:00 AM Francisco Mercedes PTA [facility]   5/23/2017 11:30 AM Francisco Mercedes PTA [facility]   5/26/2017 8:30 AM Francisco Mercedes PTA [facility]   5/26/2017 9:00 AM Jazmyne Grace, PT [facility]   5/30/2017 9:30 AM Francisco Mercedes PTA [facility]   5/30/2017 10:00 AM Francisco Mercedes PTA [facility]   6/2/2017 9:00 AM Nahomy Noland PTA [facility]   6/2/2017 9:30 AM Nahomy Noland, PTA [facility]   6/8/2017 11:45 AM Tonya Mckeon  [address]

## 2017-05-19 ENCOUNTER — APPOINTMENT (OUTPATIENT)
Dept: PHYSICAL THERAPY | Age: 67
End: 2017-05-19
Payer: MEDICARE

## 2017-05-23 ENCOUNTER — HOSPITAL ENCOUNTER (OUTPATIENT)
Dept: PHYSICAL THERAPY | Age: 67
Discharge: HOME | End: 2017-05-23
Payer: MEDICARE

## 2017-05-23 PROCEDURE — G8984 CARRY CURRENT STATUS: HCPCS | Performed by: PHYSICAL THERAPIST

## 2017-05-23 PROCEDURE — G8979 MOBILITY GOAL STATUS: HCPCS | Performed by: PHYSICAL THERAPIST

## 2017-05-23 PROCEDURE — G8978 MOBILITY CURRENT STATUS: HCPCS | Performed by: PHYSICAL THERAPIST

## 2017-05-23 PROCEDURE — G8985 CARRY GOAL STATUS: HCPCS | Performed by: PHYSICAL THERAPIST

## 2017-05-23 PROCEDURE — 97140 MANUAL THERAPY 1/> REGIONS: CPT

## 2017-05-23 PROCEDURE — 97110 THERAPEUTIC EXERCISES: CPT

## 2017-05-23 NOTE — PROGRESS NOTES
PT DAILY TREATMENT NOTE - [facility]     Patient Name: Nelson Savage  Date:2017  : 1950  [x]  Patient  Verified  Payor: Pancho Ee / Plan: [place] MEDICARE PART A & B / Product Type: Medicare /    In time:11:47  Out time:12:23  Total Treatment Time (min): 36  Total Timed Codes (min): 36  1:1 Treatment Time ( [address] only): 36   Visit #: 7 of 12    Treatment Area: Radiculopathy, cervical region [M54.12]  Sprain of shoulder, right [S43.401A]    SUBJECTIVE  Pain Level (0-10 scale): 2  Any medication changes, allergies to medications, adverse drug reactions, diagnosis change, or new procedure performed?: [x] No    [] Yes (see summary sheet for update)  Subjective functional status/changes:   [] No changes reported  Pt reports that sitting and reading at the computer bother her neck and shoulder pain.      OBJECTIVE        Min Type Additional Details    [] Estim:  []Unatt       []IFC  []Premod                        []Other:  []w/ice   []w/heat  Position:  Location:    [] Estim: []Att    []TENS instruct  []NMES                    []Other:  []w/US   []w/ice   []w/heat  Position:  Location:    []  Traction: [] Cervical       []Lumbar                       [] Prone          []Supine                       []Intermittent   []Continuous Lbs:  [] before manual  [] after manual    []  Ultrasound: []Continuous   [] Pulsed                           []1MHz   []3MHz W/cm2:  Location:    []  Iontophoresis with dexamethasone         Location: [] Take home patch   [] In clinic    []  Ice     []  heat  []  Ice massage  []  Laser   []  Anodyne Position:  Location:    []  Laser with stim  []  Other:  Position:  Location:    []  Vasopneumatic Device Pressure:       [] lo [] med [] hi   Temperature: [] lo [] med [] hi   [] Skin assessment post-treatment:  []intact []redness- no adverse reaction    []redness - adverse reaction:       28 min Therapeutic Exercise: [x] See flow sheet :   Rationale: increase ROM and increase strength to improve the patients ability to increase ease with ADLs.     8 min Manual Therapy: Per flow sheet   Rationale: decrease pain, increase ROM, increase tissue extensibility and decrease trigger points to increase tolerance to activities.               With   [] TE   [] TA   [] neuro   [] other: Patient Education: [x] Review HEP    [] Progressed/Changed HEP based on:   [] positioning   [] body mechanics   [] transfers   [] heat/ice application    [] other:      Other Objective/Functional Measures: see goals     Pain Level (0-10 scale) post treatment: 1    ASSESSMENT/Changes in Function: Pt has progressed toward LTGs. Pt reports 50% improvement since [facility]. Pt has a neck FOTO of 56, increased by 4 since [facility]. Pt's R shoulder flex is 135 deg and scap 125 deg without pain. Pt's c/s AROM measurements are as follows: flex 38 (+2), ext 30 (0), R SB 32 (+14), L SB 35 (+15), R rot 45 (+5), L rot 41 (+1). Patient will continue to benefit from skilled PT services to modify and progress therapeutic interventions, address functional mobility deficits, address ROM deficits, address strength deficits and analyze and address soft tissue restrictions to attain remaining goals. []  See Plan of Care  [x]  See progress note/recertification  []  See Discharge Summary         Progress towards goals / Updated goals:  Short term goals: to be accomplished in 2 weeks  1) Pt will report (I) and compliance with HEP for home management of symptoms. At [facility]: Instructed, demonstrated, and performed HEP  Current: Goal met per pt report (5/2/17)  2) Pt will improve C/S AROM all planes 5-8 deg for ease with driving.   At eval: flex 35 deg with pain, ext 30 deg no pain, R SB 18 deg with pain, L SB 20 deg no pain, R rot 38 deg with pain on R, L rot 40 deg with pain on R  Current: Progressing, flex 38 (+2), ext 30 (0), R SB 32 (+14), L SB 35 (+15), R rot 45 (+5), L rot 41 (+1). 5/23/17   Long term goals: to be accomplished in 6 weeks  1) Pt will improve Neck FOTO score > or = 69 indicating improvements in function. At eval: Neck FOTO = 52  Current; Progressed: FOTO = 56, increased by 4 since [facility]. 5/23/17  2) Pt will improve C/S AROM all planes 10-15 deg for ease with driving and ADL's. At eval: flex 35 deg with pain, ext 30 deg no pain, R SB 18 deg with pain, L SB 20 deg no pain, R rot 38 deg with pain on R, L rot 40 deg with pain on R  Current: Not met: flex 38 (+2), ext 30 (0), R SB 32 (+14), L SB 35 (+15), R rot 45 (+5), L rot 41 (+1). 5/23/17   3) Pt will improve R shoulder AROM flex/scap WFL w/o pain in order to perform ADL's. At eval: flex/scap 90 deg with pain in R UT and R parascapular mm  Curretn: Progressing: flex 135 deg, scap 125 deg without pain. 5/16/17  4) Pt will report > or = 60% improvement in symptoms in order to progress rehab.   At eval: 0%  Current: Progressing: Pt reports 50% improvement since [facility]. 5/23/17    PLAN  []  Upgrade activities as tolerated     [x]  Continue plan of care  []  Update interventions per flow sheet       []  Discharge due to:_  []  Other:_      Shanika Poon, PTA 5/23/2017  11:09 AM    Future Appointments  Date Time Provider Luis Robin   5/23/2017 11:30 AM Shanika Poon, PTA [facility]   5/26/2017 8:30 AM Shanika Poon, PTA [facility]   5/26/2017 9:00 AM Shanika Poon, PTA [facility]   5/30/2017 9:30 AM Shanika Poon, PTA [facility]   5/30/2017 10:00 AM Shanika Poon, PTA [facility]   6/2/2017 9:00 AM Nahomy Noland, PTA [facility]   6/2/2017 9:30 AM Nahomy Noland, PTA [facility]   6/8/2017 11:45 AM Mounika Marie  [address]

## 2017-05-23 NOTE — PROGRESS NOTES
PT DAILY TREATMENT NOTE - [facility]     Patient Name: Shaheed Dewitt  Date:2017  : 1950  [x]  Patient  Verified  Payor: Luisa Vora / Plan: [place] MEDICARE PART A & B / Product Type: Medicare /    In time:11:02  Out time:11:47  Total Treatment Time (min): 45  Total Timed Codes (min): 35  1:1 Treatment Time ([address] only): 35   Visit #: 7 of 12    Treatment Area: Lumbar pain [M54.5]  Sacroiliac pain [M53.3]    SUBJECTIVE  Pain Level (0-10 scale): 2  Any medication changes, allergies to medications, adverse drug reactions, diagnosis change, or new procedure performed?: [x] No    [] Yes (see summary sheet for update)  Subjective functional status/changes:   [] No changes reported  Pt reports that bending over and driving cause her back to have increase in pain. OBJECTIVE    Modality rationale: decrease pain to improve the patients ability to decrease difficulty while performing tasks.     Min Type Additional Details    [] Estim:  []Unatt       []IFC  []Premod                        []Other:  []w/ice   []w/heat  Position:  Location:    [] Estim: []Att    []TENS instruct  []NMES                    []Other:  []w/US   []w/ice   []w/heat  Position:  Location:    []  Traction: [] Cervical       []Lumbar                       [] Prone          []Supine                       []Intermittent   []Continuous Lbs:  [] before manual  [] after manual    []  Ultrasound: []Continuous   [] Pulsed                           []1MHz   []3MHz W/cm2:  Location:    []  Iontophoresis with dexamethasone         Location: [] Take home patch   [] In clinic   10 []  Ice     [x]  heat  []  Ice massage  []  Laser   []  Anodyne Position:sitting  Location:neck and back    []  Laser with stim  []  Other:  Position:  Location:    []  Vasopneumatic Device Pressure:       [] lo [] med [] hi   Temperature: [] lo [] med [] hi   [] Skin assessment post-treatment:  []intact []redness- no adverse reaction    []redness - adverse reaction: 27 min Therapeutic Exercise: [x] See flow sheet :   Rationale: increase ROM and increase strength to improve the patients ability to increase ease with ADLs.     8 min Manual Therapy: Per flow sheet   Rationale: decrease pain, increase ROM, increase tissue extensibility and decrease trigger points to increase tolerance to activities.               With   [] TE   [] TA   [] neuro   [] other: Patient Education: [x] Review HEP    [] Progressed/Changed HEP based on:   [] positioning   [] body mechanics   [] transfers   [] heat/ice application    [] other:      Other Objective/Functional Measures: see goals. Pain Level (0-10 scale) post treatment: 1    ASSESSMENT/Changes in Function: Pt has progressed toward LTGs. Pt reports 50% improvement since [facility]. Pt has a FOTO score fo 63, increased by 19 since [facility]. Pt is able to bridge through full ROM but has increase in R hip pain. Pt's B hip MMT measurements are as follows: hip flex  R 3/5, L 3+/5, L hip abd 4/5, R hip abd 4-/5, B hip ext 3+/5. Pt has progressed and is (-) for SLR on L, but remains (+) on R. Pt's L/S AROM is WNL in all planes, but continues to have increase pain in R hip with most motions. Patient will continue to benefit from skilled PT services to modify and progress therapeutic interventions, address functional mobility deficits, address ROM deficits, address strength deficits and analyze and address soft tissue restrictions to attain remaining goals. []  See Plan of Care  [x]  See progress note/recertification  []  See Discharge Summary         Progress towards goals / Updated goals:  Short term goals: to be accomplished in 2 weeks  1) Pt will report (I) and compliance with HEP for home management of symptoms. At eval: Instructed, demonstrated, and performed HEP  Current: Goal met per pt report (5/2/17)  2) Pt will present with (-) SLR B for in order to perform ADL's. At eval: (+) SLR B  Current; Partly met: R (+) SLR, L (-). 17  Long term goals: to be accomplished in 6 weeks  1) Pt's FOTO score will improve > or = 60 indicating improvements in function. At eval: FOTO = 44   Current: Goal met: FOTO = 63, increased by 19 since [facility]. 17  2) Pt will improve L/S AROM flex, ext, B SB WFL w/o an increase in pain in order to perform ADL's. At eval: flex limited 50% with increase in pain, ext [facility] [place] but slight increase in pain, R SB limited 10% with increase in pain, L SB WFL but increase in pain  Current; Progressing: flex WNL with minimal increase in pain, ext WNL with minimal increase in pain, R SB WNL without pain, L SB WNL with increase in pain. 17  3) Pt will improve B hip MMT > or = 4/5 for functional strength during ADL's. At eval: hip flex L 3+/5 R 3-/5, hip abd B 4-/5, hip ext L 3+/5 R 3-/5  Current: Not met: hip flex  R 3/5, L 3+/5, L hip abd 4/5, R hip abd 4-/5, B hip ext 3+/5 17  4) Pt will demo bridge through full range w/o pain indicating improved core/glute strength. At eval: pt demos bridge through 50% range, no increase in pain  Current; Progressing: bridges through full ROM with little increase in pain in R hip. 17  5) Pt will report > or = 60% improvement in symptoms in order to progress rehab.   At eval: 0%  Current: progressin% improvement since [facility]. 17    PLAN  []  Upgrade activities as tolerated     [x]  Continue plan of care  []  Update interventions per flow sheet       []  Discharge due to:_  []  Other:_      Archie Zaldivar PTA 2017  11:06 AM    Future Appointments  Date Time Provider Luis Robin   2017 11:30 AM Archie Zaldivar PTA [facility]   2017 8:30 AM Archie Zaldivar PTA [facility]   2017 9:00 AM Archie Zaldivar, PTA [facility]   2017 9:30 AM Archie Zaldivar, PTA [facility]   2017 10:00 AM Archie Zaldivar, PTA [facility]   2017 9:00 AM Nahomy Noland, PTA [facility]   2017 9:30 AM Nahomy Noland, PTA [facility]   2017 11:45 AM [address]  [address]

## 2017-05-24 NOTE — PROGRESS NOTES
[facility] - [facility]              [address] [place], [address] [place]   [phone] fax    Medicare Progress Report    Patient name: Thanh Soto Start of Care: 17   Referral source: Yelena Tenorio MD : 1950   Medical/Treatment Diagnosis: Lumbar pain [M54.5]  Sacroiliac pain [M53.3] Onset Date:2017     Prior Hospitalization: see medical history Provider#: [id number]   Medications: Verified on Patient Summary List    Comorbidities: Back pain  Prior Level of Function: Pt is retired; (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 Visits from Start of Care: 7    Missed Visits: 1    Reporting Period: 17 to 17    Subjective Reports: Pt reports that bending over and driving cause her back to have increase in pain. Progress towards goals / Updated goals:  Short term goals: to be accomplished in 2 weeks  1) Pt will report (I) and compliance with HEP for home management of symptoms. At [facility]: Instructed, demonstrated, and performed HEP  Current: Goal met per pt report  2) Pt will present with (-) SLR B for in order to perform ADL's. At [facility]: (+) SLR B  Current; Partly met: R (+) SLR, L (-)  Long term goals: to be accomplished in 6 weeks  1) Pt's FOTO score will improve > or = 60 indicating improvements in function. At [facility]: FOTO = 44   Current: Goal met: FOTO = 63, increased by 19 since [facility]  2) Pt will improve L/S AROM flex, ext, B SB WFL w/o an increase in pain in order to perform ADL's. At eval: flex limited 50% with increase in pain, ext [facility] [place] but slight increase in pain, R SB limited 10% with increase in pain, L SB WFL but increase in pain  Current; Progressing: flex WNL with minimal increase in pain, ext WNL with minimal increase in pain, R SB WNL without pain, L SB WNL with increase in pain  3) Pt will improve B hip MMT > or = 4/5 for functional strength during ADL's.   At eval: hip flex L 3+/5 R 3-/5, hip abd B 4-/5, hip ext L 3+/5 R 3-/5  Current: Not met: hip flex R 3/5, L 3+/5, L hip abd 4/5, R hip abd 4-/5, B hip ext 3+/5   4) Pt will demo bridge through full range w/o pain indicating improved core/glute strength. At eval: pt demos bridge through 50% range, no increase in pain  Current; Progressing: bridges through full ROM with little increase in pain in R hip  5) Pt will report > or = 60% improvement in symptoms in order to progress rehab. At eval: 0%  Current: progressin% improvement since [facility]    Key functional changes: improved activity tolerance and mobility, decrease pain       Problems/ barriers to goal attainment:      Assessment / Recommendations: Pt has progressed toward LTGs. Pt reports 50% improvement since [facility]. Pt has a FOTO score fo 63, increased by 19 since [facility]. Pt is able to bridge through full ROM but has increase in R hip pain. Pt's B hip MMT measurements are as follows: hip flex R 3/5, L 3+/5, L hip abd 4/5, R hip abd 4-/5, B hip ext 3+/5. Pt has progressed and is (-) for SLR on L, but remains (+) on R. Pt's L/S AROM is WNL in all planes, but continues to have increase pain in R hip with most motions. Patient will continue to benefit from skilled PT services to modify and progress therapeutic interventions, address functional mobility deficits, address ROM deficits, address strength deficits and analyze and address soft tissue restrictions to attain remaining goals.     Problem List: pain affecting function, decrease ROM, decrease strength, impaired gait/ balance, decrease ADL/ functional abilitiies, decrease activity tolerance and decrease flexibility/ joint mobility   Treatment Plan: Therapeutic exercise, Therapeutic activities, Neuromuscular re-education, Physical agent/modality, Gait/balance training, Manual therapy, Patient education, Functional mobility training and Stair training    Patient Goal (s) has been updated and includes: \"keep getting better\"     Updated Goals to be accomplished in 4 weeks:  Continue with unmet goals     Frequency / Duration: Patient to be seen 2 times per week for 4 weeks:    G-Codes (GP)  Mobility  [id number] Current  CK= 40-59%   Goal  CJ= 20-39%    The severity rating is based on clinical judgment and the FOTO score.       Katherine Sam, PT 5/24/2017 1:34 PM

## 2017-05-24 NOTE — PROGRESS NOTES
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 No previous h/o neck/arm pain. Visits from Start of Care: 7    Missed Visits: 1    Reporting Period: 17 to 17    Subjective Reports: Pt reports that sitting and reading at the computer bother her neck and shoulder pain. Progress towards goals / Updated goals:  Short term goals: to be accomplished in 2 weeks  1) Pt will report (I) and compliance with HEP for home management of symptoms. At eval: Instructed, demonstrated, and performed HEP  Current: Goal met per pt report   2) Pt will improve C/S AROM all planes 5-8 deg for ease with driving. At eval: flex 35 deg with pain, ext 30 deg no pain, R SB 18 deg with pain, L SB 20 deg no pain, R rot 38 deg with pain on R, L rot 40 deg with pain on R  Current: Progressing, flex 38 (+2), ext 30 (0), R SB 32 (+14), L SB 35 (+15), R rot 45 (+5), L rot 41 (+1)  Long term goals: to be accomplished in 6 weeks  1) Pt will improve Neck FOTO score > or = 69 indicating improvements in function. At eval: Neck FOTO = 52  Current; Progressed: FOTO = 56, increased by 4 since [facility]  2) Pt will improve C/S AROM all planes 10-15 deg for ease with driving and ADL's.   At eval: flex 35 deg with pain, ext 30 deg no pain, R SB 18 deg with pain, L SB 20 deg no pain, R rot 38 deg with pain on R, L rot 40 deg with pain on R  Current: Progressing: flex 38 (+2), ext 30 (0), R SB 32 (+14), L SB 35 (+15), R rot 45 (+5), L rot 41 (+1)    3) Pt will improve R shoulder AROM flex/scap WFL w/o pain in order to perform ADL's. At eval: flex/scap 90 deg with pain in R UT and R parascapular mm  Curretn: Progressing: flex 135 deg, scap 125 deg without pain  4) Pt will report > or = 60% improvement in symptoms in order to progress rehab. At eval: 0%  Current: Progressing: Pt reports 50% improvement since [facility]    Key functional changes: improved ROM and pain      Problems/ barriers to goal attainment:      Assessment / Recommendations: Pt has progressed toward LTGs. Pt reports 50% improvement since [facility]. Pt has a neck FOTO of 56, increased by 4 since [facility]. Pt's R shoulder flex is 135 deg and scap 125 deg without pain. Pt's c/s AROM measurements are as follows: flex 38 (+2), ext 30 (0), R SB 32 (+14), L SB 35 (+15), R rot 45 (+5), L rot 41 (+1). Patient will continue to benefit from skilled PT services to modify and progress therapeutic interventions, address functional mobility deficits, address ROM deficits, address strength deficits and analyze and address soft tissue restrictions to attain remaining goals. Problem List: pain affecting function, decrease ROM, decrease strength, decrease ADL/ functional abilitiies, decrease activity tolerance and decrease flexibility/ joint mobility   Treatment Plan: Therapeutic exercise, Therapeutic activities, Neuromuscular re-education, Physical agent/modality, Manual therapy, Patient education and Functional mobility training    Patient Goal (s) has been updated and includes: \"keep the pain down\"     Updated Goals to be accomplished in 4 weeks:  Continue with unmet goals above    Frequency / Duration: Patient to be seen 2 times per week for 4 weeks:    G-Codes (GP)  Carry   Current  CL= 60-79%    Goal  CK= 40-59%    The severity rating is based on clinical judgment and the FOTO score.       Zaida Chinchilla, PT 5/24/2017 7:58 AM

## 2017-05-26 ENCOUNTER — HOSPITAL ENCOUNTER (OUTPATIENT)
Dept: PHYSICAL THERAPY | Age: 67
Discharge: HOME | End: 2017-05-26
Payer: MEDICARE

## 2017-05-26 PROCEDURE — 97140 MANUAL THERAPY 1/> REGIONS: CPT

## 2017-05-26 PROCEDURE — 97110 THERAPEUTIC EXERCISES: CPT

## 2017-05-26 PROCEDURE — 97124 MASSAGE THERAPY: CPT

## 2017-05-26 NOTE — PROGRESS NOTES
PT DAILY TREATMENT NOTE - [facility]     Patient Name: Calvin Sky  Date:2017  : 1950  [x]  Patient  Verified  Payor: Lewis Maloney / Plan: [place] MED[place] & B / Product Type: Medicare /    In time: 9:15 Out time:9:57  Total Treatment Time (min): 42  Total Timed Codes (min): 32  1:1 Treatment Time ( only): 32   Visit #: 1 of 8    Treatment Area: Lumbar pain [M54.5]  Sacroiliac pain [M53.3]    SUBJECTIVE  Pain Level (0-10 scale): 2  Any medication changes, allergies to medications, adverse drug reactions, diagnosis change, or new procedure performed?: [x] No    [] Yes (see summary sheet for update)  Subjective functional status/changes:   [] No changes reported  Pt reports that her was having pain running down her leg today, she has not had that in a while. OBJECTIVE    Modality rationale: decrease pain to improve the patients ability to decrease difficulty while performing tasks.     Min Type Additional Details    [] Estim:  []Unatt       []IFC  []Premod                        []Other:  []w/ice   []w/heat  Position:  Location:    [] Estim: []Att    []TENS instruct  []NMES                    []Other:  []w/US   []w/ice   []w/heat  Position:  Location:    []  Traction: [] Cervical       []Lumbar                       [] Prone          []Supine                       []Intermittent   []Continuous Lbs:  [] before manual  [] after manual    []  Ultrasound: []Continuous   [] Pulsed                           []1MHz   []3MHz W/cm2:  Location:    []  Iontophoresis with dexamethasone         Location: [] Take home patch   [] In clinic   10 []  Ice     [x]  heat  []  Ice massage  []  Laser   []  Anodyne Position:sitting  Location:neck and back    []  Laser with stim  []  Other:  Position:  Location:    []  Vasopneumatic Device Pressure:       [] lo [] med [] hi   Temperature: [] lo [] med [] hi   [] Skin assessment post-treatment:  []intact []redness- no adverse reaction    []redness - adverse reaction:         24 min Therapeutic Exercise: [x] See flow sheet :   Rationale: increase ROM and increase strength to improve the patients ability to increase ease with ADLs.     8 min Manual Therapy: Per flow sheet   Rationale: decrease pain, increase ROM, increase tissue extensibility and decrease trigger points to increase tolerance to activities.         With   [] TE   [] TA   [] neuro   [] other: Patient Education: [x] Review HEP    [] Progressed/Changed HEP based on:   [] positioning   [] body mechanics   [] transfers   [] heat/ice application    [] other:      Other Objective/Functional Measures: Pt TTP along R piriformis     Pain Level (0-10 scale) post treatment: 0    ASSESSMENT/Changes in Function: Continue per POC. Patient will continue to benefit from skilled PT services to modify and progress therapeutic interventions, address functional mobility deficits, address ROM deficits, address strength deficits and analyze and address soft tissue restrictions to attain remaining goals. []  See Plan of Care  []  See progress note/recertification  []  See Discharge Summary         Progress towards goals / Updated goals:  Current; Partly met: R (+) SLR, L (-). 5/23/17  Long term goals: to be accomplished in 6 weeks  1) Pt will present with (-) SLR B for in order to perform ADL's. At PN:  R (+) SLR, L (-). 5/23/17  2) Pt will improve L/S AROM flex, ext, B SB WFL w/o an increase in pain in order to perform ADL's. At PN: flex WNL with minimal increase in pain, ext WNL with minimal increase in pain, R SB WNL without pain, L SB WNL with increase in pain. 5/23/17  3) Pt will improve B hip MMT > or = 4/5 for functional strength during ADL's. At PN : hip flex R 3/5, L 3+/5, L hip abd 4/5, R hip abd 4-/5, B hip ext 3+/5 5/23/17  4) Pt will demo bridge through full range w/o pain indicating improved core/glute strength. At PN : bridges through full ROM with little increase in pain in R hip.  5/16/17  5) Pt will report > or = 60% improvement in symptoms in order to progress rehab.   At PN:  50% improvement since [facility]. 5/23/17    PLAN  []  Upgrade activities as tolerated     [x]  Continue plan of care  []  Update interventions per flow sheet       []  Discharge due to:_  []  Other:_      Angeles Cleary PTA 5/26/2017  9:12 AM    Future Appointments  Date Time Provider Luis Robin   5/30/2017 9:30 AM Angeles Cleary PTA [facility]   5/30/2017 10:00 AM MIRI León [facility]   6/2/2017 9:00 AM Nahomy Noland PTA [facility]   6/2/2017 9:30 AM Nahomy Noland PTA [facility]   6/8/2017 11:45 AM Aldair Stinson  [address]

## 2017-05-26 NOTE — PROGRESS NOTES
PT DAILY TREATMENT NOTE - [facility]     Patient Name: Yoana Carreno  Date:2017  : 1950  [x]  Patient  Verified  Payor: Valorie Torrez / Plan: [place] MEDICARE PART A & B / Product Type: Medicare /    In time:8:42  Out time:9:15  Total Treatment Time (min): 33  Total Timed Codes (min): 33  1:1 Treatment Time ([facility] only): 33   Visit #: 1 of 4    Treatment Area: Radiculopathy, cervical region [M54.12]  Sprain of shoulder, right [S43.401A]    SUBJECTIVE  Pain Level (0-10 scale): 0  Any medication changes, allergies to medications, adverse drug reactions, diagnosis change, or new procedure performed?: [x] No    [] Yes (see summary sheet for update)  Subjective functional status/changes:   [] No changes reported  Pt reports that she has not been having much pain in her neck and shoulders.      OBJECTIVE        Min Type Additional Details    [] Estim:  []Unatt       []IFC  []Premod                        []Other:  []w/ice   []w/heat  Position:  Location:    [] Estim: []Att    []TENS instruct  []NMES                    []Other:  []w/US   []w/ice   []w/heat  Position:  Location:    []  Traction: [] Cervical       []Lumbar                       [] Prone          []Supine                       []Intermittent   []Continuous Lbs:  [] before manual  [] after manual    []  Ultrasound: []Continuous   [] Pulsed                           []1MHz   []3MHz W/cm2:  Location:    []  Iontophoresis with dexamethasone         Location: [] Take home patch   [] In clinic    []  Ice     []  heat  []  Ice massage  []  Laser   []  Anodyne Position:  Location:    []  Laser with stim  []  Other:  Position:  Location:    []  Vasopneumatic Device Pressure:       [] lo [] med [] hi   Temperature: [] lo [] med [] hi   [] Skin assessment post-treatment:  []intact []redness- no adverse reaction    []redness - adverse reaction:         25 min Therapeutic Exercise: [x] See flow sheet :   Rationale: increase ROM and increase strength to improve the patients ability to increase ease with ADLs.     8 min Manual Therapy: Per flow sheet   Rationale: decrease pain, increase ROM, increase tissue extensibility and decrease trigger points to increase tolerance to activities. Rationale: With   [] TE   [] TA   [] neuro   [] other: Patient Education: [x] Review HEP    [] Progressed/Changed HEP based on:   [] positioning   [] body mechanics   [] transfers   [] heat/ice application    [] other:      Other Objective/Functional Measures: Pt TTP along R UT. Pain Level (0-10 scale) post treatment: 0    ASSESSMENT/Changes in Function: Continue per POC. Patient will continue to benefit from skilled PT services to modify and progress therapeutic interventions, address functional mobility deficits, address ROM deficits, address strength deficits and analyze and address soft tissue restrictions to attain remaining goals. []  See Plan of Care  []  See progress note/recertification  []  See Discharge Summary         Progress towards goals / Updated goals:  Long term goals: to be accomplished in 6 weeks  1) Pt will improve Neck FOTO score > or = 69 indicating improvements in function. At PN:  FOTO = 56, increased by 4 since [facility]. 5/23/17  2) Pt will improve C/S AROM all planes 10-15 deg for ease with driving and ADL's. At PN: flex 38 (+2), ext 30 (0), R SB 32 (+14), L SB 35 (+15), R rot 45 (+5), L rot 41 (+1). 5/23/17   3) Pt will improve R shoulder AROM flex/scap WFL w/o pain in order to perform ADL's. At PN:  flex 135 deg, scap 125 deg without pain. 5/16/17  4) Pt will report > or = 60% improvement in symptoms in order to progress rehab.   At PN:  Pt reports 50% improvement since [facility]. 5/23/17    PLAN  []  Upgrade activities as tolerated     [x]  Continue plan of care  []  Update interventions per flow sheet       []  Discharge due to:_  []  Other:_      Aden Salazar, M[facility] 5/26/2017  9:09 AM    Future Appointments  Date Time Provider Luis Robin 5/30/2017 9:30 AM Marybeth Thomas, [facility]   5/30/2017 10:00 AM Marybeth Thomas, PTA [facility]   6/2/2017 9:00 AM Nahomy Noland, PTA [facility]   6/2/2017 9:30 AM Nahomy Noland, PTA [facility]   6/8/2017 11:45 AM Tonya Zaman  [address]

## 2017-05-30 ENCOUNTER — HOSPITAL ENCOUNTER (OUTPATIENT)
Dept: PHYSICAL THERAPY | Age: 67
Discharge: HOME | End: 2017-05-30
Payer: MEDICARE

## 2017-05-30 PROCEDURE — 97110 THERAPEUTIC EXERCISES: CPT

## 2017-05-30 PROCEDURE — 97140 MANUAL THERAPY 1/> REGIONS: CPT

## 2017-05-30 NOTE — PROGRESS NOTES
PT DAILY TREATMENT NOTE - [facility]     Patient Name: Yoana Carreno  Date:2017  : 1950  [x]  Patient  Verified  Payor: [place] MEDICARE / Plan: [place] MEDICARE PART A & B / Product Type: Medicare /    In time:10:12  Out time:11:00  Total Treatment Time (min): 48  Total Timed Codes (min): 38  1:1 Treatment Time ( [address] only): 38   Visit #: 2 of 8    Treatment Area: Lumbar pain [M54.5]  Sacroiliac pain [M53.3]    SUBJECTIVE  Pain Level (0-10 scale): 2  Any medication changes, allergies to medications, adverse drug reactions, diagnosis change, or new procedure performed?: [x] No    [] Yes (see summary sheet for update)  Subjective functional status/changes:   [] No changes reported  Pt reports she get hip pain a lot in the morning after she has been sleeping. OBJECTIVE    Modality rationale: decrease pain to improve the patients ability to decrease difficulty while performing tasks.     Min Type Additional Details    [] Estim:  []Unatt       []IFC  []Premod                        []Other:  []w/ice   []w/heat  Position:  Location:    [] Estim: []Att    []TENS instruct  []NMES                    []Other:  []w/US   []w/ice   []w/heat  Position:  Location:    []  Traction: [] Cervical       []Lumbar                       [] Prone          []Supine                       []Intermittent   []Continuous Lbs:  [] before manual  [] after manual    []  Ultrasound: []Continuous   [] Pulsed                           []1MHz   []3MHz W/cm2:  Location:    []  Iontophoresis with dexamethasone         Location: [] Take home patch   [] In clinic   10 []  Ice     [x]  heat  []  Ice massage  []  Laser   []  Anodyne Position:sitting  Location:neck and back    []  Laser with stim  []  Other:  Position:  Location:    []  Vasopneumatic Device Pressure:       [] lo [] med [] hi   Temperature: [] lo [] med [] hi   [] Skin assessment post-treatment:  []intact []redness- no adverse reaction    []redness - adverse reaction:         30 min Therapeutic Exercise: [x] See flow sheet :   Rationale: increase ROM and increase strength to improve the patients ability to increase ease with ADLs.     8 min Manual Therapy: Per flow sheet   Rationale: decrease pain, increase ROM, increase tissue extensibility and decrease trigger points to increase tolerance to activities.               With   [] TE   [] TA   [] neuro   [] other: Patient Education: [x] Review HEP    [] Progressed/Changed HEP based on:   [] positioning   [] body mechanics   [] transfers   [] heat/ice application    [] other:      Other Objective/Functional Measures:  B SLR (-)      Pain Level (0-10 scale) post treatment: 0    ASSESSMENT/Changes in Function: Pt was educated to sleep with a pillow between her legs when she sleeps on her side. Continue per POC. Patient will continue to benefit from skilled PT services to modify and progress therapeutic interventions, address functional mobility deficits, address ROM deficits, address strength deficits and analyze and address soft tissue restrictions to attain remaining goals. []  See Plan of Care  []  See progress note/recertification  []  See Discharge Summary         Progress towards goals / Updated goals:  Current; Partly met: R (+) SLR, L (-). 5/23/17  Long term goals: to be accomplished in 6 weeks  1) Pt will present with (-) SLR B for in order to perform ADL's. At PN:  R (+) SLR, L (-). 5/23/17  Current: Goal met: B SLR (-) 5/30/17  2) Pt will improve L/S AROM flex, ext, B SB WFL w/o an increase in pain in order to perform ADL's. At PN: flex WNL with minimal increase in pain, ext WNL with minimal increase in pain, R SB WNL without pain, L SB WNL with increase in pain. 5/23/17  3) Pt will improve B hip MMT > or = 4/5 for functional strength during ADL's.   At PN : hip flex R 3/5, L 3+/5, L hip abd 4/5, R hip abd 4-/5, B hip ext 3+/5 5/23/17  4) Pt will demo bridge through full range w/o pain indicating improved core/glute strength. At PN : bridges through full ROM with little increase in pain in R hip. 5/16/17  5) Pt will report > or = 60% improvement in symptoms in order to progress rehab.   At PN:  50% improvement since [facility]. 5/23/17    PLAN  []  Upgrade activities as tolerated     [x]  Continue plan of care  []  Update interventions per flow sheet       []  Discharge due to:_  []  Other:_      Cathy Rodriguez PTA 5/30/2017  9:34 AM    Future Appointments  Date Time Provider Luis Robin   5/30/2017 10:00 AM Cathy Rodriguez PTA [facility]   6/2/2017 9:00 AM Nahomy Noland PTA [facility]   6/2/2017 9:30 AM Nahomy Noland PTA [facility]   6/6/2017 9:30 AM Sofy Lopez [facility]   6/6/2017 10:00 AM Jc Bautista [facility]   6/8/2017 11:45 AM Aidee Painting  [address]   6/9/2017 11:00 AM Cathy Rodriguez PTA [facility]   6/9/2017 11:30 AM Cathy Rodriguez PTA [facility]   6/14/2017 10:30 AM Cathy Rodriguez PTA [facility]   6/14/2017 11:00 AM Cathy Rodriguez PTA [facility]   6/16/2017 8:00 AM [facility] PT Lillian 1 [facility]   6/16/2017 8:30 AM [facility] PT Lillian 1 [facility]   6/21/2017 11:00 AM Cathy Rodriguez PTA [facility]   6/21/2017 11:30 AM Cathy Rodriguez, PTA [facility]   6/23/2017 11:00 AM Cathy Rodriguez, PTA [facility]   6/23/2017 11:30 AM Cathy Rodriguez, PTA [facility]   7/5/2017 11:00 AM Nahomy Noland, PTA [facility]   7/5/2017 11:30 AM Nahomy Noland, PTA [facility]   7/7/2017 11:00 AM Cathy Rodriguez, PTA [facility]   7/7/2017 11:30 AM Cathy Rodriguez, PTA [facility]

## 2017-05-30 NOTE — PROGRESS NOTES
PT DAILY TREATMENT NOTE - [facility]     Patient Name: Morgan Velázquez  Date:2017  : 1950  [x]  Patient  Verified  Payor: [place] MEDICARE / Plan: VA MEDICARE PART A & B / Product Type: Medicare /    In time:9:32  Out time:10:12  Total Treatment Time (min): 40  Total Timed Codes (min): 40  1:1 Treatment Time ([address] only): 40   Visit #: 2 of 8    Treatment Area: Radiculopathy, cervical region [M54.12]  Sprain of shoulder, right [S43.401A]    SUBJECTIVE  Pain Level (0-10 scale): 0  Any medication changes, allergies to medications, adverse drug reactions, diagnosis change, or new procedure performed?: [x] No    [] Yes (see summary sheet for update)  Subjective functional status/changes:   [] No changes reported  Pt reports that her neck and shoulders bothered her after she had been driving for 35 minutes this past weekend, but the pain decreased after she did her neck stretches.      OBJECTIVE        Min Type Additional Details    [] Estim:  []Unatt       []IFC  []Premod                        []Other:  []w/ice   []w/heat  Position:  Location:    [] Estim: []Att    []TENS instruct  []NMES                    []Other:  []w/US   []w/ice   []w/heat  Position:  Location:    []  Traction: [] Cervical       []Lumbar                       [] Prone          []Supine                       []Intermittent   []Continuous Lbs:  [] before manual  [] after manual    []  Ultrasound: []Continuous   [] Pulsed                           []1MHz   []3MHz W/cm2:  Location:    []  Iontophoresis with dexamethasone         Location: [] Take home patch   [] In clinic    []  Ice     []  heat  []  Ice massage  []  Laser   []  Anodyne Position:  Location:    []  Laser with stim  []  Other:  Position:  Location:    []  Vasopneumatic Device Pressure:       [] lo [] med [] hi   Temperature: [] lo [] med [] hi   [] Skin assessment post-treatment:  []intact []redness- no adverse reaction    []redness - adverse reaction:         32 min Therapeutic Exercise: [x] See flow sheet :   Rationale: increase ROM and increase strength to improve the patients ability to increase ease with ADLs.     8 min Manual Therapy: Per flow sheet   Rationale: decrease pain, increase ROM, increase tissue extensibility and decrease trigger points to increase tolerance to activities.        With   [] TE   [] TA   [] neuro   [] other: Patient Education: [x] Review HEP    [] Progressed/Changed HEP based on:   [] positioning   [] body mechanics   [] transfers   [] heat/ice application    [] other:      Other Objective/Functional Measures: flex 150 deg without pain, scap 140 deg with pain. Pain Level (0-10 scale) post treatment: 0    ASSESSMENT/Changes in Function: Continue per POC. Patient will continue to benefit from skilled PT services to modify and progress therapeutic interventions, address functional mobility deficits, address ROM deficits, address strength deficits and analyze and address soft tissue restrictions to attain remaining goals. []  See Plan of Care  []  See progress note/recertification  []  See Discharge Summary         Progress towards goals / Updated goals:  Long term goals: to be accomplished in 6 weeks  1) Pt will improve Neck FOTO score > or = 69 indicating improvements in function. At PN:  FOTO = 56, increased by 4 since [facility]. 5/23/17  2) Pt will improve C/S AROM all planes 10-15 deg for ease with driving and ADL's. At PN: flex 38 (+2), ext 30 (0), R SB 32 (+14), L SB 35 (+15), R rot 45 (+5), L rot 41 (+1). 5/23/17   3) Pt will improve R shoulder AROM flex/scap WFL w/o pain in order to perform ADL's. At PN:  flex 135 deg, scap 125 deg without pain. 5/16/17  Current: Partly met: flex 150 deg without pain, scap 140 deg with pain. 5/30/17  4) Pt will report > or = 60% improvement in symptoms in order to progress rehab.   At PN:  Pt reports 50% improvement since [facility]. 5/23/17       PLAN  []  Upgrade activities as tolerated     [x]  Continue plan of care  []  Update interventions per flow sheet       []  Discharge due to:_  []  Other:_      Melvin Wells PTA 5/30/2017  9:36 AM    Future Appointments  Date Time Provider Luis Sharda   5/30/2017 10:00 AM Melvin Wells, PTA [facility]   6/2/2017 9:00 AM Nahomy Noland, PTA [facility]   6/2/2017 9:30 AM Nahomy Noland, PTA [facility]   6/6/2017 9:30 AM Sofy Lopez [facility]   6/6/2017 10:00 AM Dana Kelsi [facility]   6/8/2017 11:45 AM Kayleen Simpson  [address]   6/9/2017 11:00 AM Melvin Wells PTA [facility]   6/9/2017 11:30 AM Melvin Wells, PTA [facility]   6/14/2017 10:30 AM Melvin Wells, PTA [facility]   6/14/2017 11:00 AM Melvin Wells PTA [facility]   6/16/2017 8:00 AM [facility] [place] [facility]   6/16/2017 8:30 AM [facility] [place] [facility]   6/21/2017 11:00 AM Melvin Wells, PTA [facility]   6/21/2017 11:30 AM Melvin Wells PTA [facility]   6/23/2017 11:00 AM Melvin Wells, PTA [facility]   6/23/2017 11:30 AM Melvin Wells, PTA [facility]   7/5/2017 11:00 AM Nahomy Noland, PTA [facility]   7/5/2017 11:30 AM Nahomy Noland, PTA [facility]   7/7/2017 11:00 AM Melvin Wells, MIRI [facility]   7/7/2017 11:30 AM Melvin Wells, MIRI [facility]

## 2017-06-02 ENCOUNTER — HOSPITAL ENCOUNTER (OUTPATIENT)
Dept: PHYSICAL THERAPY | Age: 67
Discharge: HOME | End: 2017-06-02
Payer: MEDICARE

## 2017-06-02 PROCEDURE — 97140 MANUAL THERAPY 1/> REGIONS: CPT

## 2017-06-02 PROCEDURE — 97110 THERAPEUTIC EXERCISES: CPT

## 2017-06-02 NOTE — PROGRESS NOTES
PT DAILY TREATMENT NOTE - [facility]     Patient Name: Raheem Reyes  Date:2017  : 1950  [x]  Patient  Verified  Payor: VA MEDICARE / Plan: VA MEDICARE PART A & B / Product Type: Medicare /    In time: 9:00  Out time: 9:41  Total Treatment Time (min): 41  Total Timed Codes (min): 41  1:1 Treatment Time ( [address] only): 41  Visit #: 2 of 8    Treatment Area: Lumbar pain [M54.5]  Sacroiliac pain [M53.3]    SUBJECTIVE  Pain Level (0-10 scale): 1/10  Any medication changes, allergies to medications, adverse drug reactions, diagnosis change, or new procedure performed?: [x] No    [] Yes (see summary sheet for update)  Subjective functional status/changes:   [] No changes reported  Pt reports only minimal pain today. OBJECTIVE    Modality rationale:    Min Type Additional Details    [] Estim:  []Unatt       []IFC  []Premod                        []Other:  []w/ice   []w/heat  Position:  Location:    [] Estim: []Att    []TENS instruct  []NMES                    []Other:  []w/US   []w/ice   []w/heat  Position:  Location:    []  Traction: [] Cervical       []Lumbar                       [] Prone          []Supine                       []Intermittent   []Continuous Lbs:  [] before manual  [] after manual    []  Ultrasound: []Continuous   [] Pulsed                           []1MHz   []3MHz W/cm2:  Location:    []  Iontophoresis with dexamethasone         Location: [] Take home patch   [] In clinic    []  Ice     []  heat  []  Ice massage  []  Laser   []  Anodyne Position:  Location:    []  Laser with stim  []  Other:  Position:  Location:    []  Vasopneumatic Device Pressure:       [] lo [] med [] hi   Temperature: [] lo [] med [] hi   [] Skin assessment post-treatment:  []intact []redness- no adverse reaction    []redness - adverse reaction:     31 min Therapeutic Exercise:  [x] See flow sheet :   Rationale: increase ROM and increase strength to improve the patients ability to perform ADLs.      10 min Manual Therapy:  Per flow sheet   Rationale: decrease pain, increase ROM and increase tissue extensibility to increase ease of ADLs. With   [] TE   [] TA   [] neuro   [] other: Patient Education: [x] Review HEP    [] Progressed/Changed HEP based on:   [] positioning   [] body mechanics   [] transfers   [] heat/ice application    [] other:      Other Objective/Functional Measures:  Bridges full and pain free. Pain Level (0-10 scale) post treatment: 2/10    ASSESSMENT/Changes in Function: Cont per POC. Patient will continue to benefit from skilled PT services to modify and progress therapeutic interventions, address functional mobility deficits, address ROM deficits and address strength deficits to attain remaining goals. []  See Plan of Care  []  See progress note/recertification  []  See Discharge Summary         Progress towards goals / Updated goals:  Current; Partly met: R (+) SLR, L (-). 5/23/17  Long term goals: to be accomplished in 6 weeks  1) Pt will present with (-) SLR B for in order to perform ADL's. At PN: R (+) SLR, L (-). 5/23/17  Current: Goal met: B SLR (-) 5/30/17  2) Pt will improve L/S AROM flex, ext, B SB WFL w/o an increase in pain in order to perform ADL's. At PN: flex WNL with minimal increase in pain, ext WNL with minimal increase in pain, R SB WNL without pain, L SB WNL with increase in pain. 5/23/17  3) Pt will improve B hip MMT > or = 4/5 for functional strength during ADL's. At PN : hip flex R 3/5, L 3+/5, L hip abd 4/5, R hip abd 4-/5, B hip ext 3+/5 5/23/17  4) Pt will demo bridge through full range w/o pain indicating improved core/glute strength. At PN : bridges through full ROM with little increase in pain in R hip. 5/16/17  Current: Met, bridges full and pain free. 6/2/17    5) Pt will report > or = 60% improvement in symptoms in order to progress rehab.   At PN: 50% improvement since [facility]. 5/23/17    PLAN  []  Upgrade activities as tolerated     [x]  Continue plan of care  []  Update interventions per flow sheet       []  Discharge due to:_  []  Other:_      Nahomy Noland, PTA 6/2/2017  9:40 AM    Future Appointments  Date Time Provider Luis Robin   6/6/2017 9:30 AM Suri Ag [facility]   6/6/2017 10:00 AM Suri Ag [facility]   6/8/2017 11:45 AM Jorge Rodriguez  [address]   6/9/2017 11:00 AM Sahil Pilar, PTA [facility]   6/9/2017 11:30 AM Sahil Pilar, PTA [facility]   6/14/2017 10:30 AM Sahil Pilar, PTA [facility]   6/14/2017 11:00 AM [facility]ilar, [facility]TA [facility]   6/16/2017 8:00 AM [facility] PT [place] [facility]   6/16/2017 8:30 AM [facility] PT [place] [facility]   6/21/2017 11:00 AM Sahil Pilar, PTA [facility]   6/21/2017 11:30 AM Sahil Pilar, PTA [facility]   6/23/2017 11:00 AM Sahil Pilar, PTA [facility]   6/23/2017 11:30 AM Sahil Pilar, PTA [facility]   7/5/2017 11:00 AM Nahomy E Laws, PTA [facility]   7/5/2017 11:30 AM Nahomy E Laws, PTA [facility]   7/7/2017 11:00 AM Sahil Broussard PTA [facility]   7/7/2017 11:30 AM MIRI Hinds [facility]

## 2017-06-02 NOTE — PROGRESS NOTES
PT DAILY TREATMENT NOTE - [facility]     Patient Name: Yonis Storey  Date:2017  : 1950  [x]  Patient  Verified  Payor: VA MEDICARE / Plan: VA MEDICARE PART A & B / Product Type: Medicare /    In time: 9:42  Out time:10:14  Total Treatment Time (min): 32  Total Timed Codes (min): 32  1:1 Treatment Time ( [address] only): 32   Visit #: 3 of 8    Treatment Area: Radiculopathy, cervical region [M54.12]  Sprain of shoulder, right [S43.401A]    SUBJECTIVE  Pain Level (0-10 scale): 0/10  Any medication changes, allergies to medications, adverse drug reactions, diagnosis change, or new procedure performed?: [x] No    [] Yes (see summary sheet for update)  Subjective functional status/changes:   [] No changes reported  Pt reports her neck pain is less frequent.     OBJECTIVE    Modality rationale:    Min Type Additional Details    [] Estim:  []Unatt       []IFC  []Premod                        []Other:  []w/ice   []w/heat  Position:  Location:    [] Estim: []Att    []TENS instruct  []NMES                    []Other:  []w/US   []w/ice   []w/heat  Position:  Location:    []  Traction: [] Cervical       []Lumbar                       [] Prone          []Supine                       []Intermittent   []Continuous Lbs:  [] before manual  [] after manual    []  Ultrasound: []Continuous   [] Pulsed                           []1MHz   []3MHz W/cm2:  Location:    []  Iontophoresis with dexamethasone         Location: [] Take home patch   [] In clinic    []  Ice     []  heat  []  Ice massage  []  Laser   []  Anodyne Position:  Location:    []  Laser with stim  []  Other:  Position:  Location:    []  Vasopneumatic Device Pressure:       [] lo [] med [] hi   Temperature: [] lo [] med [] hi   [] Skin assessment post-treatment:  []intact []redness- no adverse reaction    []redness - adverse reaction:     22 min Therapeutic Exercise:  [x] See flow sheet :   Rationale: increase ROM and increase strength to improve the patients ability to perform ADLs. 10 min Manual Therapy:  Per flow sheet   Rationale: decrease pain, increase ROM and increase tissue extensibility to increase ease of ADLs. With   [] TE   [] TA   [] neuro   [] other: Patient Education: [x] Review HEP    [] Progressed/Changed HEP based on:   [] positioning   [] body mechanics   [] transfers   [] heat/ice application    [] other:      Other Objective/Functional Measures: Pt requires VCs during Power band therex to decrease UT substitutiion . Pain Level (0-10 scale) post treatment: 0/10    ASSESSMENT/Changes in Function: Cont per POC. Patient will continue to benefit from skilled PT services to modify and progress therapeutic interventions, address functional mobility deficits, address ROM deficits and address strength deficits to attain remaining goals. []  See Plan of Care  []  See progress note/recertification  []  See Discharge Summary         Progress towards goals / Updated goals:  Long term goals: to be accomplished in 6 weeks  1) Pt will improve Neck FOTO score > or = 69 indicating improvements in function. At PN: FOTO = 56, increased by 4 since [facility]. 5/23/17  2) Pt will improve C/S AROM all planes 10-15 deg for ease with driving and ADL's. At PN: flex 38 (+2), ext 30 (0), R SB 32 (+14), L SB 35 (+15), R rot 45 (+5), L rot 41 (+1). 5/23/17   3) Pt will improve R shoulder AROM flex/scap WFL w/o pain in order to perform ADL's. At PN: flex 135 deg, scap 125 deg without pain. 5/16/17  Current: Partly met: flex 150 deg without pain, scap 140 deg with pain. 5/30/17  4) Pt will report > or = 60% improvement in symptoms in order to progress rehab.   At PN: Pt reports 50% improvement since [facility]. 5/23/17    PLAN  []  Upgrade activities as tolerated     [x]  Continue plan of care  []  Update interventions per flow sheet       []  Discharge due to:_  []  Other:_      Nahomy Noland, PTA 6/2/2017  10:12 AM    Future Appointments  Date Time Provider Luis Robin   6/6/2017 9:30 AM Chelsi Hernandez [facility]   6/6/2017 10:00 AM Chelsi Hernandez [facility]   6/8/2017 11:45 AM Brennon Bliss  [address]   6/9/2017 11:00 AM Harish Gonsalez PTA [facility]   6/9/2017 11:30 AM Harish Gonsalez PTA [facility]   6/14/2017 10:30 AM Harish Gonsalez PTA [facility]   6/14/2017 11:00 AM Harish Gonsalez PTA [facility]   6/16/2017 8:00 AM [facility] PT [place] [facility]   6/16/2017 8:30 AM [facility] PT [place] [facility]   6/21/2017 11:00 AM Harish Gonsalez PTA [facility]   6/21/2017 11:30 AM Harish Gonsalez PTA [facility]   6/23/2017 11:00 AM Harish Gonsalez PTA [facility]   6/23/2017 11:30 AM Harish Gonsalez PTA [facility]   7/5/2017 11:00 AM Nahomy Noland, PTA [facility]   7/5/2017 11:30 AM Nahomy Noland, PTA [facility]   7/7/2017 11:00 AM Harish Gonsalez PTA [facility]   7/7/2017 11:30 AM Harish Gonsalez PTA [facility]

## 2017-06-06 ENCOUNTER — HOSPITAL ENCOUNTER (OUTPATIENT)
Dept: PHYSICAL THERAPY | Age: 67
Discharge: HOME | End: 2017-06-06
Payer: MEDICARE

## 2017-06-06 PROCEDURE — 97110 THERAPEUTIC EXERCISES: CPT

## 2017-06-06 PROCEDURE — 97140 MANUAL THERAPY 1/> REGIONS: CPT

## 2017-06-06 NOTE — PROGRESS NOTES
PT DAILY TREATMENT NOTE - [facility]-16    Patient Name: Eliu Alvarez  Date:2017  : 1950  [x]  Patient  Verified  Payor: VA MEDICARE / Plan: VA MEDICARE PART A & B / Product Type: Medicare /    In time: 9:36  Out time:10:17  Total Treatment Time (min): 41  Total Timed Codes (min): 41  1:1 Treatment Time ( [address] only): 41   Visit #: 3 of 8    Treatment Area: Lumbar pain [M54.5]  Sacroiliac pain [M53.3]    SUBJECTIVE  Pain Level (0-10 scale): 3  Any medication changes, allergies to medications, adverse drug reactions, diagnosis change, or new procedure performed?: [x] No    [] Yes (see summary sheet for update)  Subjective functional status/changes:   [] No changes reported  Patient reports that sitting and walking for longer periods of times gives her pain. \"I can definitely feel a difference since starting therapy, my legs are stronger. \"    OBJECTIVE  33 min Therapeutic Exercise:  [x] See flow sheet :   Rationale: increase ROM, increase strength and improve coordination to improve the patients ability to increase ease with ADLs    8 min Manual Therapy:  Right sciatic nerve glide, STM/DTM bilateral l/s paraspinals and right QL, l/s UPAs   Rationale: decrease pain, increase ROM and increase tissue extensibility to ease ADL tolerance            With   [x] TE   [x] TA   [] neuro   [] other: Patient Education: [x] Review HEP    [] Progressed/Changed HEP based on:   [] positioning   [] body mechanics   [] transfers   [] heat/ice application    [] other:      Other Objective/Functional Measures:   Noted several trigger points throughout right paraspinals, patient TTP  Cues TA engagement during mini squats   Added 1 and 1/2 # for 6\" step ups and to decrease weight through B UE support on //bars  Tactile cues to avoid hip flexor substitution with s/l hip abd reps  Patient requires one short rest break during therapy mat exercises    Pain Level (0-10 scale) post treatment: 2    ASSESSMENT/Changes in Function: Patient is increasing in strength as demonstrated by able to add weight per flowsheet with slight decrease in pain pre/post session, requires minimal cueing for correct therex form. Will continue to focus on core strength and l/s mobility for increased ease of sitting/walking longer periods. Patient will continue to benefit from skilled PT services to modify and progress therapeutic interventions, address functional mobility deficits, address ROM deficits, address strength deficits, analyze and address soft tissue restrictions, analyze and cue movement patterns, analyze and modify body mechanics/ergonomics and assess and modify postural abnormalities to attain remaining goals. []  See Plan of Care  []  See progress note/recertification  []  See Discharge Summary         Progress towards goals / Updated goals:  Current; Partly met: R (+) SLR, L (-). 5/23/17  Long term goals: to be accomplished in 6 weeks  1) Pt will present with (-) SLR B for in order to perform ADL's. At PN: R (+) SLR, L (-). 5/23/17  Current: Goal met: B SLR (-) 5/30/17  2) Pt will improve L/S AROM flex, ext, B SB WFL w/o an increase in pain in order to perform ADL's. At PN: flex WNL with minimal increase in pain, ext WNL with minimal increase in pain, R SB WNL without pain, L SB WNL with increase in pain. 5/23/17  3) Pt will improve B hip MMT > or = 4/5 for functional strength during ADL's. At PN : hip flex R 3/5, L 3+/5, L hip abd 4/5, R hip abd 4-/5, B hip ext 3+/5 5/23/17  4) Pt will demo bridge through full range w/o pain indicating improved core/glute strength. At PN : bridges through full ROM with little increase in pain in R hip. 5/16/17  Current: Met, bridges full and pain free. 6/2/17   5) Pt will report > or = 60% improvement in symptoms in order to progress rehab.   At PN: 50% improvement since [facility] 5/23/17    PLAN  []  Upgrade activities as tolerated     [x]  Continue plan of care  []  Update interventions per flow sheet []  Discharge due to:_  []  Other:_      Daniel Goldy 6/6/2017  8:14 AM    Future Appointments  Date Time Provider Luis Robin   6/6/2017 9:30 AM Daniel Corpus [facility]   6/6/2017 10:00 AM Daniel Corpus [facility]   6/8/2017 11:45 AM Natalie Harris  [address]   6/9/2017 11:00 AM Guanakito Rachel PTA [facility]   6/9/2017 11:30 AM Guanakito Rachel PTA [facility]   6/14/2017 10:30 AM Guanakito Rachel PTA [facility]   6/14/2017 11:00 AM Guanakito Rachel PTA [facility]   6/16/2017 8:00 AM [facility] PT [place] 1 [facility]   6/16/2017 8:30 AM [facility] PT [place] 1 [facility]   6/21/2017 11:00 AM Guanakito Rachel PTA [facility]   6/21/2017 11:30 AM Guanakito Rachel PTA [facility]   6/23/2017 11:00 AM Guanakito Rachel PTA [facility]   6/23/2017 11:30 AM Guanakito Rachel PTA [facility]   7/5/2017 11:00 AM Nahomydougie Noland, PTA [facility]   7/5/2017 11:30 AM Nahomydougie Noland, PTA [facility]   7/7/2017 11:00 AM Guanakito Rachel PTA [facility]   7/7/2017 11:30 AM Guanakito Rachel PTA [facility]

## 2017-06-06 NOTE — PROGRESS NOTES
PT DAILY TREATMENT NOTE - [facility] 3-16    Patient Name: Rodríguez Meyer  Date:2017  : 1950  [x]  Patient  Verified  Payor: [place] MEDICARE / Plan: [place] MEDICARE PART A & B / Product Type: Medicare /    In time:10:18  Out time:11:04  Total Treatment Time (min): 46  Total Timed Codes (min): 36  1:1 Treatment Time ( [address] only): 36  Visit #: 4 of 8    Treatment Area: Radiculopathy, cervical region [M54.12]  Sprain of shoulder, right [S43.401A]    SUBJECTIVE  Pain Level (0-10 scale): 1  Any medication changes, allergies to medications, adverse drug reactions, diagnosis change, or new procedure performed?: [x] No    [] Yes (see summary sheet for update)  Subjective functional status/changes:   [] No changes reported  \"I did my stretches last night and it helped. \"    OBJECTIVE  Modality rationale: decrease pain and increase tissue extensibility to improve the patients ability to ease soreness after therapy   Min Type Additional Details    [] Estim:  []Unatt       []IFC  []Premod                        []Other:  []w/ice   []w/heat  Position:  Location:    [] Estim: []Att    []TENS instruct  []NMES                    []Other:  []w/US   []w/ice   []w/heat  Position:  Location:    []  Traction: [] Cervical       []Lumbar                       [] Prone          []Supine                       []Intermittent   []Continuous Lbs:  [] before manual  [] after manual    []  Ultrasound: []Continuous   [] Pulsed                           []1MHz   []3MHz Location:  W/cm2:    []  Iontophoresis with dexamethasone         Location: [] Take home patch   [] In clinic   10 []  Ice     [x]  heat  []  Ice massage  []  Laser   []  Anodyne Position:long sitting  Location: c/s and l/s    []  Laser with stim  []  Other: Position:  Location:    []  Vasopneumatic Device Pressure:       [] lo [] med [] hi   Temperature: [] lo [] med [] hi   [x] Skin assessment post-treatment:  [x]intact []redness- no adverse reaction    []redness - adverse reaction:     28 min Therapeutic Exercise:  [x] See flow sheet :   Rationale: increase ROM, increase strength and improve coordination to improve the patients ability to perform ADLs with less pain    8 min Manual Therapy:  SOR, STM/DTM/TPR to right c/s paraspinals, UT/LS   Rationale: decrease pain, increase ROM and increase tissue extensibility to ease ADL tolerance            With   [] TE   [] TA   [] neuro   [] other: Patient Education: [x] Review HEP    [] Progressed/Changed HEP based on:   [] positioning   [] body mechanics   [] transfers   [] heat/ice application    [] other:      Other Objective/Functional Measures:   Noted multiple trigger points throughout right LS   Patient reports 85% improvement since [facility]    Moderate cueing to avoid UT compensation with 1/2 prone letters and theraband reps    Pain Level (0-10 scale) post treatment: 0    ASSESSMENT/Changes in Function: Patient is progressing towards goals, reports 85% improvement in symptoms since [facility]. Noted multiple trigger points throughout right LS, which decreased post manual. Will continue POC with focus on avoiding UT compensation and increasing scapula stabilizer strength. Patient will continue to benefit from skilled PT services to modify and progress therapeutic interventions, address functional mobility deficits, address ROM deficits, address strength deficits, analyze and address soft tissue restrictions, analyze and cue movement patterns, analyze and modify body mechanics/ergonomics and assess and modify postural abnormalities to attain remaining goals. []  See Plan of Care  []  See progress note/recertification  []  See Discharge Summary         Progress towards goals / Updated goals:  Long term goals: to be accomplished in 6 weeks  1) Pt will improve Neck FOTO score > or = 69 indicating improvements in function.   At PN: FOTO = 56, increased by 4 since [facility]. 5/23/17  2) Pt will improve C/S AROM all planes 10-15 deg for ease with driving and ADL's. At PN: flex 38 (+2), ext 30 (0), R SB 32 (+14), L SB 35 (+15), R rot 45 (+5), L rot 41 (+1). 5/23/17   3) Pt will improve R shoulder AROM flex/scap WFL w/o pain in order to perform ADL's. At PN: flex 135 deg, scap 125 deg without pain. 5/16/17  Current: Partly met: flex 150 deg without pain, scap 140 deg with pain. 5/30/17  4) Pt will report > or = 60% improvement in symptoms in order to progress rehab.   At PN: Pt reports 50% improvement since [facility]. 5/23/17  Current: met 85% since [facility] (6/6/2017)    PLAN  []  Upgrade activities as tolerated     [x]  Continue plan of care  []  Update interventions per flow sheet       []  Discharge due to:_  []  Other:_      Jack Pardo 6/6/2017  8:12 AM    Future Appointments  Date Time Provider Luis Robin   6/6/2017 9:30 AM Jack Pardo [facility]   6/6/2017 10:00 AM Jack Pardo [facility]   6/8/2017 11:45 AM Nasir Jaramillo  [address]   6/9/2017 11:00 AM Lisa Record, PTA [facility]   6/9/2017 11:30 AM Lisa Record, PTA [facility]   6/14/2017 10:30 AM Lisa Record, PTA [facility]   6/14/2017 11:00 AM Lisa Record, PTA [facility]   6/16/2017 8:00 AM [facility] PT [place] [facility]   6/16/2017 8:30 AM [facility] PT [place] [facility]   6/21/2017 11:00 AM Lisa Record, PTA [facility]   6/21/2017 11:30 AM Lisa Record, PTA [facility]   6/23/2017 11:00 AM Lisa Record, PTA [facility]   6/23/2017 11:30 AM Lisa Record, PTA [facility]   7/5/2017 11:00 AM Nahomy E Laws, PTA [facility]   7/5/2017 11:30 AM Nahomy E Laws, PTA [facility]   7/7/2017 11:00 AM Lisa Record, PTA [facility]   7/7/2017 11:30 AM Lisa Record, PTA [facility]

## 2017-06-09 ENCOUNTER — APPOINTMENT (OUTPATIENT)
Dept: PHYSICAL THERAPY | Age: 67
End: 2017-06-09
Payer: MEDICARE

## 2017-06-14 ENCOUNTER — HOSPITAL ENCOUNTER (OUTPATIENT)
Dept: PHYSICAL THERAPY | Age: 67
Discharge: HOME | End: 2017-06-14
Payer: MEDICARE

## 2017-06-14 PROCEDURE — 97140 MANUAL THERAPY 1/> REGIONS: CPT

## 2017-06-14 PROCEDURE — 97110 THERAPEUTIC EXERCISES: CPT

## 2017-06-14 NOTE — PROGRESS NOTES
PT DAILY TREATMENT NOTE - [facility]     Patient Name: Eliu Alvarez  Date:2017  : 1950  [x]  Patient  Verified  Payor: Den Jump / Plan: [place] MEDICARE PART A & B / Product Type: Medicare /    In time:10:34  Out time:11:16  Total Treatment Time (min): 42  Total Timed Codes (min): 32  1:1 Treatment Time ( [address] only): 32   Visit #: 4 of 8    Treatment Area: Lumbar pain [M54.5]  Sacroiliac pain [M53.3]    SUBJECTIVE  Pain Level (0-10 scale): 2  Any medication changes, allergies to medications, adverse drug reactions, diagnosis change, or new procedure performed?: [x] No    [] Yes (see summary sheet for update)  Subjective functional status/changes:   [] No changes reported  Pt reports that her hip has been bothering her a little bit. OBJECTIVE    Modality rationale: decrease pain to improve the patients ability to decrease difficulty while performing tasks.     Min Type Additional Details    [] Estim:  []Unatt       []IFC  []Premod                        []Other:  []w/ice   []w/heat  Position:  Location:    [] Estim: []Att    []TENS instruct  []NMES                    []Other:  []w/US   []w/ice   []w/heat  Position:  Location:    []  Traction: [] Cervical       []Lumbar                       [] Prone          []Supine                       []Intermittent   []Continuous Lbs:  [] before manual  [] after manual    []  Ultrasound: []Continuous   [] Pulsed                           []1MHz   []3MHz W/cm2:  Location:    []  Iontophoresis with dexamethasone         Location: [] Take home patch   [] In clinic   10 []  Ice     [x]  heat  []  Ice massage  []  Laser   []  Anodyne Position:sitting  Location:back and neck     []  Laser with stim  []  Other:  Position:  Location:    []  Vasopneumatic Device Pressure:       [] lo [] med [] hi   Temperature: [] lo [] med [] hi   [] Skin assessment post-treatment:  []intact []redness- no adverse reaction    []redness - adverse reaction:         24 min Therapeutic Exercise: [x] See flow sheet :   Rationale: increase ROM and increase strength to improve the patients ability to increase ease with ADLs.     8 min Manual Therapy: Per flow sheet   Rationale: decrease pain, increase ROM, increase tissue extensibility and decrease trigger points to increase tolerance to activities. With   [] TE   [] TA   [] neuro   [] other: Patient Education: [x] Review HEP    [] Progressed/Changed HEP based on:   [] positioning   [] body mechanics   [] transfers   [] heat/ice application    [] other:      Other Objective/Functional Measures: FOTO = 54, increased by 10 since [facility]. Pain Level (0-10 scale) post treatment: 1    ASSESSMENT/Changes in Function: Continue per POC. Patient will continue to benefit from skilled PT services to modify and progress therapeutic interventions, address functional mobility deficits, address ROM deficits, address strength deficits and analyze and address soft tissue restrictions to attain remaining goals. []  See Plan of Care  []  See progress note/recertification  []  See Discharge Summary         Progress towards goals / Updated goals:  Long term goals: to be accomplished in 6 weeks  1) Pt will present with (-) SLR B for in order to perform ADL's. At PN: R (+) SLR, L (-). 5/23/17  Current: Goal met: B SLR (-) 5/30/17  2) Pt will improve L/S AROM flex, ext, B SB WFL w/o an increase in pain in order to perform ADL's. At PN: flex WNL with minimal increase in pain, ext WNL with minimal increase in pain, R SB WNL without pain, L SB WNL with increase in pain. 5/23/17  3) Pt will improve B hip MMT > or = 4/5 for functional strength during ADL's. At PN : hip flex R 3/5, L 3+/5, L hip abd 4/5, R hip abd 4-/5, B hip ext 3+/5 5/23/17  4) Pt will demo bridge through full range w/o pain indicating improved core/glute strength. At PN : bridges through full ROM with little increase in pain in R hip. 5/16/17  Current: Met, bridges full and pain free. 6/2/17   5) Pt will report > or = 60% improvement in symptoms in order to progress rehab.   At PN: 50% improvement since [facility]. 5/23/17    PLAN  []  Upgrade activities as tolerated     [x]  Continue plan of care  []  Update interventions per flow sheet       []  Discharge due to:_  []  Other:_      Amol Rodriguez PTA 6/14/2017  10:44 AM    Future Appointments  Date Time Provider Luis Robin   6/14/2017 11:00 AM Amol Rodriguez PTA [facility]   6/16/2017 8:00 AM Maureen Montana [facility]   6/16/2017 8:30 AM Maureen Montana [facility]   6/21/2017 11:00 AM Amol Rodriguez PTA [facility]   6/21/2017 11:30 AM Amol Rodriguez PTA [facility]   6/23/2017 11:00 AM Amol Rodriguez PTA [facility]   6/23/2017 11:30 AM Amol Rodriguez PTA [facility]   7/5/2017 11:00 AM Nahomy Noland PTA [facility]   7/5/2017 11:30 AM Nahomy Noland, MIRI [facility]   7/7/2017 11:00 AM Amol Rodriguez PTA [facility]   7/7/2017 11:30 AM Amol Rodriguez PTA [facility]   7/27/2017 10:45 AM Tonya Alvarado  [address]

## 2017-06-14 NOTE — PROGRESS NOTES
PT DAILY TREATMENT NOTE - [facility]     Patient Name: Elvi Carrillo  Date:2017  : 1950  [x]  Patient  Verified  Payor: Ofelia Chamber / Plan: [place] MEDICARE PART A & B / Product Type: Medicare /    In time:11:16  Out time:12:00  Total Treatment Time (min): 44  Total Timed Codes (min): 44  1:1 Treatment Time ( [address] only): 44   Visit #: 5 of 8    Treatment Area: Radiculopathy, cervical region [M54.12]  Sprain of shoulder, right [S43.401A]    SUBJECTIVE  Pain Level (0-10 scale): 0  Any medication changes, allergies to medications, adverse drug reactions, diagnosis change, or new procedure performed?: [x] No    [] Yes (see summary sheet for update)  Subjective functional status/changes:   [] No changes reported  Pt reports that her neck pain comes and goes.      OBJECTIVE        Min Type Additional Details    [] Estim:  []Unatt       []IFC  []Premod                        []Other:  []w/ice   []w/heat  Position:  Location:    [] Estim: []Att    []TENS instruct  []NMES                    []Other:  []w/US   []w/ice   []w/heat  Position:  Location:    []  Traction: [] Cervical       []Lumbar                       [] Prone          []Supine                       []Intermittent   []Continuous Lbs:  [] before manual  [] after manual    []  Ultrasound: []Continuous   [] Pulsed                           []1MHz   []3MHz W/cm2:  Location:    []  Iontophoresis with dexamethasone         Location: [] Take home patch   [] In clinic    []  Ice     []  heat  []  Ice massage  []  Laser   []  Anodyne Position:  Location:    []  Laser with stim  []  Other:  Position:  Location:    []  Vasopneumatic Device Pressure:       [] lo [] med [] hi   Temperature: [] lo [] med [] hi   [] Skin assessment post-treatment:  []intact []redness- no adverse reaction    []redness - adverse reaction:          36 min Therapeutic Exercise: [x] See flow sheet :   Rationale: increase ROM and increase strength to improve the patients ability to increase ease with ADLs.     8 min Manual Therapy: Per flow sheet   Rationale: decrease pain, increase ROM, increase tissue extensibility and decrease trigger points to increase tolerance to activities. With   [] TE   [] TA   [] neuro   [] other: Patient Education: [x] Review HEP    [] Progressed/Changed HEP based on:   [] positioning   [] body mechanics   [] transfers   [] heat/ice application    [] other:      Other Objective/Functional Measures: PT reports 90% improvement since [facility]. Pain Level (0-10 scale) post treatment: 0    ASSESSMENT/Changes in Function: Continue per POC. Patient will continue to benefit from skilled PT services to modify and progress therapeutic interventions, address functional mobility deficits, address ROM deficits, address strength deficits and analyze and address soft tissue restrictions to attain remaining goals. []  See Plan of Care  []  See progress note/recertification  []  See Discharge Summary         Progress towards goals / Updated goals:  Long term goals: to be accomplished in 6 weeks  1) Pt will improve Neck FOTO score > or = 69 indicating improvements in function. At PN: FOTO = 56, increased by 4 since [facility]. 5/23/17  2) Pt will improve C/S AROM all planes 10-15 deg for ease with driving and ADL's. At PN: flex 38 (+2), ext 30 (0), R SB 32 (+14), L SB 35 (+15), R rot 45 (+5), L rot 41 (+1). 5/23/17   3) Pt will improve R shoulder AROM flex/scap WFL w/o pain in order to perform ADL's. At PN: flex 135 deg, scap 125 deg without pain. 5/16/17  Current: Partly met: flex 150 deg without pain, scap 140 deg with pain. 5/30/17  4) Pt will report > or = 60% improvement in symptoms in order to progress rehab.   At PN: Pt reports 50% improvement since [facility]. 5/23/17  Current; goal met: PT reports 90% improvement since [facility]. 6/14/17    PLAN  []  Upgrade activities as tolerated     [x]  Continue plan of care  []  Update interventions per flow sheet       []  Discharge due to:_  []  Other:_      Jamaal Nayak PTA 6/14/2017  10:43 AM    Future Appointments  Date Time Provider Luis Robin   6/14/2017 11:00 AM Jamaal Nayak, PTA [facility]   6/16/2017 8:00 AM Beautree Lombardo [facility]   6/16/2017 8:30 AM Hugo Lombardo [facility]   6/21/2017 11:00 AM Jamaal Nayak, PTA [facility]   6/21/2017 11:30 AM Jamaal Nayak, PTA [facility]   6/23/2017 11:00 AM Jamaal Nayak, PTA [facility]   6/23/2017 11:30 AM Jamaal Nayak, PTA [facility]   7/5/2017 11:00 AM Nahomydougie Noland, PTA [facility]   7/5/2017 11:30 AM Nahomydougie Noland, PTA [facility]   7/7/2017 11:00 AM Jamaal Nayak, PTA [facility]   7/7/2017 11:30 AM Jamaal Nayak, PTA [facility]   7/27/2017 10:45 AM Cathie Benitez  [address]

## 2017-06-16 ENCOUNTER — APPOINTMENT (OUTPATIENT)
Dept: PHYSICAL THERAPY | Age: 67
End: 2017-06-16
Payer: MEDICARE

## 2017-06-21 ENCOUNTER — HOSPITAL ENCOUNTER (OUTPATIENT)
Dept: PHYSICAL THERAPY | Age: 67
Discharge: HOME | End: 2017-06-21
Payer: MEDICARE

## 2017-06-21 PROCEDURE — 97140 MANUAL THERAPY 1/> REGIONS: CPT

## 2017-06-21 PROCEDURE — 97110 THERAPEUTIC EXERCISES: CPT

## 2017-06-21 NOTE — PROGRESS NOTES
PT DAILY TREATMENT NOTE - [facility]     Patient Name: Anup Vo  Date:2017  : 1950  [x]  Patient  Verified  Payor: Carmita Jonh / Plan: [place] MEDICARE PART A & B / Product Type: Medicare /    In time:11:05  Out time:11:50  Total Treatment Time (min): 45  Total Timed Codes (min): 35  1:1 Treatment Time ( [address] only): 35   Visit #: 5 of 8    Treatment Area: Lumbar pain [M54.5]  Sacroiliac pain [M53.3]    SUBJECTIVE  Pain Level (0-10 scale): 2  Any medication changes, allergies to medications, adverse drug reactions, diagnosis change, or new procedure performed?: [x] No    [] Yes (see summary sheet for update)  Subjective functional status/changes:   [] No changes reported  Pt reports her hip bother a little bit, but she mostly feels better. OBJECTIVE    Modality rationale: decrease pain to improve the patients ability to decrease difficulty while performing tasks.     Min Type Additional Details    [] Estim:  []Unatt       []IFC  []Premod                        []Other:  []w/ice   []w/heat  Position:  Location:    [] Estim: []Att    []TENS instruct  []NMES                    []Other:  []w/US   []w/ice   []w/heat  Position:  Location:    []  Traction: [] Cervical       []Lumbar                       [] Prone          []Supine                       []Intermittent   []Continuous Lbs:  [] before manual  [] after manual    []  Ultrasound: []Continuous   [] Pulsed                           []1MHz   []3MHz W/cm2:  Location:    []  Iontophoresis with dexamethasone         Location: [] Take home patch   [] In clinic   10 []  Ice     [x]  heat  []  Ice massage  []  Laser   []  Anodyne Position:sitting  Location:neck and back    []  Laser with stim  []  Other:  Position:  Location:    []  Vasopneumatic Device Pressure:       [] lo [] med [] hi   Temperature: [] lo [] med [] hi   [] Skin assessment post-treatment:  []intact []redness- no adverse reaction    []redness - adverse reaction:         27 min Therapeutic Exercise: [x] See flow sheet :   Rationale: increase ROM and increase strength to improve the patients ability to increase ease with ADLs.     8 min Manual Therapy: Per flow sheet   Rationale: decrease pain, increase ROM, increase tissue extensibility and decrease trigger points to increase tolerance to activities. With   [] TE   [] TA   [] neuro   [] other: Patient Education: [x] Review HEP    [] Progressed/Changed HEP based on:   [] positioning   [] body mechanics   [] transfers   [] heat/ice application    [] other:      Other Objective/Functional Measures: Pt reports 85% improvement since [facility]. Pain Level (0-10 scale) post treatment: 0    ASSESSMENT/Changes in Function: Pt has progressed well with PT, plan to DC [place]. Patient will continue to benefit from skilled PT services to modify and progress therapeutic interventions, address functional mobility deficits, address ROM deficits, address strength deficits and analyze and address soft tissue restrictions to attain remaining goals. []  See Plan of Care  []  See progress note/recertification  []  See Discharge Summary         Progress towards goals / Updated goals:  Long term goals: to be accomplished in 6 weeks  1) Pt will present with (-) SLR B for in order to perform ADL's. At PN: R (+) SLR, L (-). 5/23/17  Current: Goal met: B SLR (-) 5/30/17  2) Pt will improve L/S AROM flex, ext, B SB WFL w/o an increase in pain in order to perform ADL's. At PN: flex WNL with minimal increase in pain, ext WNL with minimal increase in pain, R SB WNL without pain, L SB WNL with increase in pain. 5/23/17  3) Pt will improve B hip MMT > or = 4/5 for functional strength during ADL's. At PN : hip flex R 3/5, L 3+/5, L hip abd 4/5, R hip abd 4-/5, B hip ext 3+/5 5/23/17  4) Pt will demo bridge through full range w/o pain indicating improved core/glute strength. At PN : bridges through full ROM with little increase in pain in R hip. 5/16/17  Current: Met, bridges full and pain free. 6/2/17   5) Pt will report > or = 60% improvement in symptoms in order to progress rehab. At PN: 50% improvement since [facility]. 5/23/17  Current: goal met: Pt reports 85% improvement since [facility]. 6/21/17    PLAN  []  Upgrade activities as tolerated     []  Continue plan of care  []  Update interventions per flow sheet       []  Discharge due to:_  [x]  Other:Plan to DC [place].       Dat Moreland PTA 6/21/2017  11:15 AM    Future Appointments  Date Time Provider Luis Robin   6/21/2017 11:30 AM Dat Moreland PTA [facility]   6/23/2017 11:00 AM Mckay Barnes [facility]   6/23/2017 11:30 AM Dat Moreland PTA [facility]   7/5/2017 11:00 AM Nahomy Greenberg PTA [facility]   7/5/2017 11:30 AM Nahomy Noland PTA [facility]   7/7/2017 11:00 AM Dat Moreland PTA [facility]   7/7/2017 11:30 AM Dat Moreland PTA [facility]   7/27/2017 10:45 AM Earnest Montiel  [address]

## 2017-06-21 NOTE — PROGRESS NOTES
PT DAILY TREATMENT NOTE - [facility]     Patient Name: Chandler Rucker  Date:2017  : 1950  [x]  Patient  Verified  Payor: Leni Villarreal / Plan: [place] MED[place] & B / Product Type: Medicare /    In time:11:50  Out time:12:34  Total Treatment Time (min): 44  Total Timed Codes (min): 44  1:1 Treatment Time ( [address] only): 40   Visit #: 6 of 8    Treatment Area: Radiculopathy, cervical region [M54.12]  Sprain of shoulder, right [S43.401A]    SUBJECTIVE  Pain Level (0-10 scale): 0  Any medication changes, allergies to medications, adverse drug reactions, diagnosis change, or new procedure performed?: [x] No    [] Yes (see summary sheet for update)  Subjective functional status/changes:   [] No changes reported  Pt reports her neck does not bother her anymore.      OBJECTIVE    Modality rationale:    Min Type Additional Details    [] Estim:  []Unatt       []IFC  []Premod                        []Other:  []w/ice   []w/heat  Position:  Location:    [] Estim: []Att    []TENS instruct  []NMES                    []Other:  []w/US   []w/ice   []w/heat  Position:  Location:    []  Traction: [] Cervical       []Lumbar                       [] Prone          []Supine                       []Intermittent   []Continuous Lbs:  [] before manual  [] after manual    []  Ultrasound: []Continuous   [] Pulsed                           []1MHz   []3MHz W/cm2:  Location:    []  Iontophoresis with dexamethasone         Location: [] Take home patch   [] In clinic    []  Ice     []  heat  []  Ice massage  []  Laser   []  Anodyne Position:  Location:    []  Laser with stim  []  Other:  Position:  Location:    []  Vasopneumatic Device Pressure:       [] lo [] med [] hi   Temperature: [] lo [] med [] hi   [] Skin assessment post-treatment:  []intact []redness- no adverse reaction    []redness - adverse reaction:          36 min Therapeutic Exercise: [x] See flow sheet :   Rationale: increase ROM and increase strength to improve the patients ability to increase ease with ADLs.     8 min Manual Therapy: Per flow sheet   Rationale: decrease pain, increase ROM, increase tissue extensibility and decrease trigger points to increase tolerance to activities. With   [] TE   [] TA   [] neuro   [] other: Patient Education: [x] Review HEP    [] Progressed/Changed HEP based on:   [] positioning   [] body mechanics   [] transfers   [] heat/ice application    [] other:      Other Objective/Functional Measures: Pt TTP along R UT. Pain Level (0-10 scale) post treatment: 0    ASSESSMENT/Changes in Function: Pt has progressed well with PT and plans to DC NV. Patient will continue to benefit from skilled PT services to modify and progress therapeutic interventions, address functional mobility deficits, address ROM deficits, address strength deficits and analyze and address soft tissue restrictions to attain remaining goals. []  See Plan of Care  []  See progress note/recertification  []  See Discharge Summary         Progress towards goals / Updated goals:  Long term goals: to be accomplished in 6 weeks  1) Pt will improve Neck FOTO score > or = 69 indicating improvements in function. At PN: FOTO = 56, increased by 4 since [facility]. 5/23/17  2) Pt will improve C/S AROM all planes 10-15 deg for ease with driving and ADL's. At PN: flex 38 (+2), ext 30 (0), R SB 32 (+14), L SB 35 (+15), R rot 45 (+5), L rot 41 (+1). 5/23/17   3) Pt will improve R shoulder AROM flex/scap WFL w/o pain in order to perform ADL's. At PN: flex 135 deg, scap 125 deg without pain. 5/16/17  Current: Partly met: flex 150 deg without pain, scap 140 deg with pain. 5/30/17  4) Pt will report > or = 60% improvement in symptoms in order to progress rehab.   At PN: Pt reports 50% improvement since [facility]. 5/23/17  Current; goal met: PT reports 90% improvement since [facility]. 6/14/17    PLAN  []  Upgrade activities as tolerated     [x]  Continue plan of care  []  Update interventions per flow sheet       []  Discharge due to:_  []  Other:_      Juli Burnett, PTA 6/21/2017  11:14 AM    Future Appointments  Date Time Provider Luis Robin   6/21/2017 11:30 AM Juli Burnett, PTA [facility]   6/23/2017 11:00 AM Juli Burnett, PTA [facility]   6/23/2017 11:30 AM Juli Burnett, PTA [facility]   7/5/2017 11:00 AM Nahomy Noland, PTA [facility]   7/5/2017 11:30 AM Nahomy Noland, PTA [facility]   7/7/2017 11:00 AM Juli Burnett, PTA [facility]   7/7/2017 11:30 AM Juli Burnett, PTA [facility]   7/27/2017 10:45 AM Tonya Peace  [address]

## 2017-06-23 ENCOUNTER — HOSPITAL ENCOUNTER (OUTPATIENT)
Dept: PHYSICAL THERAPY | Age: 67
Discharge: HOME | End: 2017-06-23
Payer: MEDICARE

## 2017-06-23 PROCEDURE — 97110 THERAPEUTIC EXERCISES: CPT

## 2017-06-23 PROCEDURE — G8979 MOBILITY GOAL STATUS: HCPCS | Performed by: PHYSICAL THERAPIST

## 2017-06-23 PROCEDURE — G8985 CARRY GOAL STATUS: HCPCS | Performed by: PHYSICAL THERAPIST

## 2017-06-23 PROCEDURE — G8980 MOBILITY D/C STATUS: HCPCS | Performed by: PHYSICAL THERAPIST

## 2017-06-23 PROCEDURE — G8986 CARRY D/C STATUS: HCPCS | Performed by: PHYSICAL THERAPIST

## 2017-06-23 NOTE — PROGRESS NOTES
PT DAILY TREATMENT NOTE - [facility]     Patient Name: Sy Martinez  Date:2017  : 1950  [x]  Patient  Verified  Payor: Margarito Mcpherson / Plan: [place] MEDICARE PART A & B / Product Type: Medicare /    In time:10:59  Out time:11:40  Total Treatment Time (min): 41  Total Timed Codes (min): 41  1:1 Treatment Time ( [address] only): 41   Visit #: 6 of 8    Treatment Area: Radiculopathy, cervical region [M54.12]  Sprain of shoulder, right [S43.401A]    SUBJECTIVE  Pain Level (0-10 scale): 0  Any medication changes, allergies to medications, adverse drug reactions, diagnosis change, or new procedure performed?: [x] No    [] Yes (see summary sheet for update)  Subjective functional status/changes:   [] No changes reported  Pt reports she is feeling good and is not having any pain anywhere.      OBJECTIVE        Min Type Additional Details    [] Estim:  []Unatt       []IFC  []Premod                        []Other:  []w/ice   []w/heat  Position:  Location:    [] Estim: []Att    []TENS instruct  []NMES                    []Other:  []w/US   []w/ice   []w/heat  Position:  Location:    []  Traction: [] Cervical       []Lumbar                       [] Prone          []Supine                       []Intermittent   []Continuous Lbs:  [] before manual  [] after manual    []  Ultrasound: []Continuous   [] Pulsed                           []1MHz   []3MHz W/cm2:  Location:    []  Iontophoresis with dexamethasone         Location: [] Take home patch   [] In clinic    []  Ice     []  heat  []  Ice massage  []  Laser   []  Anodyne Position:  Location:    []  Laser with stim  []  Other:  Position:  Location:    []  Vasopneumatic Device Pressure:       [] lo [] med [] hi   Temperature: [] lo [] med [] hi   [] Skin assessment post-treatment:  []intact []redness- no adverse reaction    []redness - adverse reaction:       41 min Therapeutic Exercise:  [x] See flow sheet :   Rationale: increase ROM and increase strength to improve the patients ability to increase tolerance to activities. With   [] TE   [] TA   [] neuro   [] other: Patient Education: [x] Review HEP    [] Progressed/Changed HEP based on:   [] positioning   [] body mechanics   [] transfers   [] heat/ice application    [] other:      Other Objective/Functional Measures: see goals. Pain Level (0-10 scale) post treatment: 0    ASSESSMENT/Changes in Function: Pt has progress well toward LTGs. Pt reports 90% improvement since [facility]. Pt has a FOTO score of 66, increased by 14 since [facility]. Pt has progressed to R shoulder AROM is WNL without no pain in flex and scap. Pt has progressed cervical AROM in all planes, but remains limited with B Rot. []  See Plan of Care  []  See progress note/recertification  [x]  See Discharge Summary         Progress towards goals / Updated goals:  Long term goals: to be accomplished in 6 weeks  1) Pt will improve Neck FOTO score > or = 69 indicating improvements in function. At PN: FOTO = 56, increased by 4 since [facility]. 5/23/17  Current; Not MEt: FOTO = 66, increased by 14 since [facility]. 6/23/17  2) Pt will improve C/S AROM all planes 10-15 deg for ease with driving and ADL's. At PN: flex 38 (+2), ext 30 (0), R SB 32 (+14), L SB 35 (+15), R rot 45 (+5), L rot 41 (+1). 5/23/17   Current: Not met:  flex 45 deg chin to chest (+7), ext 40 deg (+10), B SB 40 deg, R Rot 45 deg (+5), L Rot 45 deg (+6) 6/23/17  3) Pt will improve R shoulder AROM flex/scap WFL w/o pain in order to perform ADL's. At PN: flex 135 deg, scap 125 deg without pain. 5/16/17  Current: Goal met: flex 150 deg without pain, scap 150 deg without pain. 6/23/17  4) Pt will report > or = 60% improvement in symptoms in order to progress rehab.   At PN: Pt reports 50% improvement since [facility]. 5/23/17  Current; goal met: PT reports 90% improvement since [facility]. 6/14/17    PLAN  []  Upgrade activities as tolerated     []  Continue plan of care  []  Update interventions per flow sheet [x]  Discharge due to:_Pt feels comfortable continuing strengthening at home.   []  Other:_      Fanny Pacheco, MIRI 6/23/2017  11:05 AM    Future Appointments  Date Time Provider Luis Robin   6/23/2017 11:30 AM Fanny Pacheco, PTA [facility]   7/5/2017 11:00 AM Nahomy Noland, PTA [facility]   7/5/2017 11:30 AM Nahomy Noland, PTA [facility]   7/7/2017 11:00 AM Fanny Pacheco, PTA [facility]   7/7/2017 11:30 AM Fanny Pacheco, PTA [facility]   7/27/2017 10:45 AM Kitty Mg  [address] [place]

## 2017-06-23 NOTE — PROGRESS NOTES
[facility]              [address]        [place], [address] [place]   [phone] fax      Discharge Summary  Patient name: Rodríguez Meyer Start of Care: 17   Referral source: Rosaura Martínez MD : 1950   Medical/Treatment Diagnosis: Lumbar pain [M54.5]  Sacroiliac pain [M53.3] Onset Date:2017     Prior Hospitalization: see medical history Provider#: [id number]   Medications: Verified on Patient Summary List    Comorbidities: Back pain  Prior Level of Function: Pt is retired; (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 Visits from Start of Care: 14    Missed Visits: 3  Reporting Period : 17 to 17      Summary of Care:  Progress towards goals / Updated goals:  Long term goals: to be accomplished in 6 weeks  1) Pt will present with (-) SLR B for in order to perform ADL's. At PN: R (+) SLR, L (-)  Current: Goal met: B SLR (-)   2) Pt will improve L/S AROM flex, ext, B SB WFL w/o an increase in pain in order to perform ADL's. At PN: flex WNL with minimal increase in pain, ext WNL with minimal increase in pain, R SB WNL without pain, L SB WNL with increase in pain  Current: Not met: Flex WNL no pain, Ext WNL with pain, B SB WNL, pain with L SB  3) Pt will improve B hip MMT > or = 4/5 for functional strength during ADL's. At PN : hip flex R 3/5, L 3+/5, L hip abd 4/5, R hip abd 4-/5, B hip ext 3+/5   Current not met: Hip flex B 4+/5, B hip abd 4/5, B hip ext 4-/5   4) Pt will demo bridge through full range w/o pain indicating improved core/glute strength. At PN : bridges through full ROM with little increase in pain in R hip  Current: Met, bridges full and pain free  5) Pt will report > or = 60% improvement in symptoms in order to progress rehab. At PN: 50% improvement since [facility]  Current: goal met: Pt reports 85% improvement since [facility]    Pt has progressed well toward LTGs and reports 85% improvement in her back and hip.  Pt has a FOTO score of 54, increased by 10 since [facility]. Pt is able to perform bridges through full ROM without increase in pain. Pt is (-) for B SLR. Pt reports she has increased hip pain after long periods of standing or walking. DC to HEP at this time. G-Codes (GP)  Mobility   [id number] Goal  CK= 40-59%  D/C  CK= 40-59%     The severity rating is based on clinical judgment and the FOTO Score score.     ASSESSMENT/RECOMMENDATIONS:  [x]Discontinue therapy: [x]Patient has reached or is progressing toward set goals      []Patient is non-compliant or has abdicated      []Due to lack of appreciable progress towards set Abundio Weiss, PT 6/23/2017 4:29 PM

## 2017-06-23 NOTE — PROGRESS NOTES
PT DAILY TREATMENT NOTE - [facility]     Patient Name: Calvin Sky  Date:2017  : 1950  [x]  Patient  Verified  Payor: [place] MEDICARE / Plan: [place] MEDICARE PART A & B / Product Type: Medicare /    In time:11:40  Out time:12:26  Total Treatment Time (min): 46  Total Timed Codes (min): 36  1:1 Treatment Time ( only): 36   Visit #: 7 of 8    Treatment Area: Lumbar pain [M54.5]  Sacroiliac pain [M53.3]    SUBJECTIVE  Pain Level (0-10 scale): 0  Any medication changes, allergies to medications, adverse drug reactions, diagnosis change, or new procedure performed?: [x] No    [] Yes (see summary sheet for update)  Subjective functional status/changes:   [] No changes reported  Pt reports her hip does not bother her most of the time, only with long periods of standing and long periods of walking.      OBJECTIVE    Modality rationale: decrease pain to improve the patients ability to decrease difficulty while performing tasks    Min Type Additional Details    [] Estim:  []Unatt       []IFC  []Premod                        []Other:  []w/ice   []w/heat  Position:  Location:    [] Estim: []Att    []TENS instruct  []NMES                    []Other:  []w/US   []w/ice   []w/heat  Position:  Location:    []  Traction: [] Cervical       []Lumbar                       [] Prone          []Supine                       []Intermittent   []Continuous Lbs:  [] before manual  [] after manual    []  Ultrasound: []Continuous   [] Pulsed                           []1MHz   []3MHz W/cm2:  Location:    []  Iontophoresis with dexamethasone         Location: [] Take home patch   [] In clinic   10 []  Ice     [x]  heat  []  Ice massage  []  Laser   []  Anodyne Position: sitting  Location:neck and back    []  Laser with stim  []  Other:  Position:  Location:    []  Vasopneumatic Device Pressure:       [] lo [] med [] hi   Temperature: [] lo [] med [] hi   [] Skin assessment post-treatment:  []intact []redness- no adverse reaction []redness - adverse reaction:       36 min Therapeutic Exercise:  [x] See flow sheet :   Rationale: increase ROM and increase strength to improve the patients ability to increase ease with ADLs. With   [] TE   [] TA   [] neuro   [] other: Patient Education: [x] Review HEP    [] Progressed/Changed HEP based on:   [] positioning   [] body mechanics   [] transfers   [] heat/ice application    [] other:      Other Objective/Functional Measures: see goals. Pain Level (0-10 scale) post treatment: 0    ASSESSMENT/Changes in Function: Pt has progressed well toward LTGs. Pt reports 85% improvement in her back and hip. Pt has a FOTO score of 54, increased by 10 since [facility]. Pt is able to perform bridges through full ROM without increase in pain. Pt is (-) for B SLR. Pt reports she has increased hip pain after long periods of standing or walking. []  See Plan of Care  []  See progress note/recertification  [x]  See Discharge Summary         Progress towards goals / Updated goals:  Long term goals: to be accomplished in 6 weeks  1) Pt will present with (-) SLR B for in order to perform ADL's. At PN: R (+) SLR, L (-). 5/23/17  Current: Goal met: B SLR (-) 5/30/17  2) Pt will improve L/S AROM flex, ext, B SB WFL w/o an increase in pain in order to perform ADL's. At PN: flex WNL with minimal increase in pain, ext WNL with minimal increase in pain, R SB WNL without pain, L SB WNL with increase in pain. 5/23/17  Current: Not met: Flex WNL no pain, Ext WNL with pain, B SB WNL, pain with L SB. 6/23/17  3) Pt will improve B hip MMT > or = 4/5 for functional strength during ADL's. At PN : hip flex R 3/5, L 3+/5, L hip abd 4/5, R hip abd 4-/5, B hip ext 3+/5 5/23/17  Current not met: Hip flex B 4+/5, B hip abd 4/5, B hip ext 4-/5   4) Pt will demo bridge through full range w/o pain indicating improved core/glute strength. At PN : bridges through full ROM with little increase in pain in R hip.  5/16/17  Current: Met, bridges full and pain free. 6/2/17   5) Pt will report > or = 60% improvement in symptoms in order to progress rehab.   At PN: 50% improvement since [facility]. 5/23/17  Current: goal met: Pt reports 85% improvement since [facility]. 6/21/17    PLAN  []  Upgrade activities as tolerated     []  Continue plan of care  []  Update interventions per flow sheet       [x]  Discharge due to:_ Pt feels she can continue strengthening at home.   []  Other:_      Aden Salazar PTA 6/23/2017  11:08 AM    Future Appointments  Date Time Provider Luis Robin   6/23/2017 11:30 AM Aden Salazar PTA [facility]   7/5/2017 11:00 AM Nahomy Noland PTA [facility]   7/5/2017 11:30 AM Nahomy Noland PTA [facility]   7/7/2017 11:00 AM Aden Salazar PTA [facility]   7/7/2017 11:30 AM Aden Salazar PTA [facility]   7/27/2017 10:45 AM Sravanthi Resendiz  [address]

## 2017-06-23 NOTE — PROGRESS NOTES
[facility]              [address], [address] [place]   [phone] fax      Discharge Summary  Patient name: Jhony Sanchez Start of Care: 17   Referral source: Jessy Mcpherson MD : 1950   Medical/Treatment Diagnosis: Radiculopathy, cervical region [M54.12]  Sprain of shoulder, right [S43.401A] Onset Date:2017     Prior Hospitalization: see medical history Provider#: [id number]   Medications: Verified on Patient Summary List    Comorbidities: BMI over 30  Prior Level of Function: Pt is retired teacher; (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 No previous h/o neck/arm pain. Visits from Start of Care: 14    Missed Visits: 3  Reporting Period : 17 to 17      Summary of Care:  Progress towards goals / Updated goals:  Long term goals: to be accomplished in 6 weeks  1) Pt will improve Neck FOTO score > or = 69 indicating improvements in function. At PN: FOTO = 56, increased by 4 since [facility]  Current; Not MEt: FOTO = 66, increased by 14 since [facility]  2) Pt will improve C/S AROM all planes 10-15 deg for ease with driving and ADL's. At PN: flex 38 (+2), ext 30 (0), R SB 32 (+14), L SB 35 (+15), R rot 45 (+5), L rot 41 (+1)  Current: Not met:  flex 45 deg chin to chest (+7), ext 40 deg (+10), B SB 40 deg, R Rot 45 deg (+5), L Rot 45 deg (+6)  3) Pt will improve R shoulder AROM flex/scap WFL w/o pain in order to perform ADL's. At PN: flex 135 deg, scap 125 deg without pain  Current: Goal met: flex 150 deg without pain, scap 150 deg without pain  4) Pt will report > or = 60% improvement in symptoms in order to progress rehab. At PN: Pt reports 50% improvement since [facility]  Current; goal met: PT reports 90% improvement since [facility]    Pt has progress well toward LTGs and reports 90% improvement since [facility]. Pt has a FOTO score of 66, increased by 14 since [facility]. Pt has progressed to R shoulder AROM is WNL without no pain in flex and scap.  Pt has progressed cervical AROM in all planes, but remains limited with B Rot. DC to HEP at this time. G-Codes (GP)  Carry    Goal  CJ= 20-39%   D/C  CJ= 20-39%    The severity rating is based on clinical judgment and the FOTO Score score.     ASSESSMENT/RECOMMENDATIONS:  [x]Discontinue therapy: [x]Patient has reached or is progressing toward set goals      []Patient is non-compliant or has abdicated      []Due to lack of appreciable progress towards set Abundio Weiss, PT 6/23/2017 4:32 PM

## 2017-07-05 ENCOUNTER — APPOINTMENT (OUTPATIENT)
Dept: PHYSICAL THERAPY | Age: 67
End: 2017-07-05

## 2017-07-07 ENCOUNTER — APPOINTMENT (OUTPATIENT)
Dept: PHYSICAL THERAPY | Age: 67
End: 2017-07-07

## 2018-07-30 ENCOUNTER — HOSPITAL ENCOUNTER (EMERGENCY)
Age: 68
Discharge: HOME | End: 2018-07-30
Attending: EMERGENCY MEDICINE
Payer: MEDICARE

## 2018-07-30 VITALS
HEIGHT: 66 IN | WEIGHT: 210 LBS | BODY MASS INDEX: 33.75 KG/M2 | TEMPERATURE: 97.8 F | HEART RATE: 78 BPM | RESPIRATION RATE: 12 BRPM | DIASTOLIC BLOOD PRESSURE: 68 MMHG | OXYGEN SATURATION: 98 % | SYSTOLIC BLOOD PRESSURE: 112 MMHG

## 2018-07-30 DIAGNOSIS — T63.301A SPIDER BITE WOUND, ACCIDENTAL OR UNINTENTIONAL, INITIAL ENCOUNTER: Primary | ICD-10-CM

## 2018-07-30 DIAGNOSIS — S20.461A: ICD-10-CM

## 2018-07-30 DIAGNOSIS — L08.9: ICD-10-CM
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
## 2018-07-30 PROCEDURE — 99282 EMERGENCY DEPT VISIT SF MDM: CPT

## 2018-07-30 RX ORDER — CLINDAMYCIN HYDROCHLORIDE 300 MG/1
300 CAPSULE ORAL 4 TIMES DAILY
Qty: 28 CAP | Refills: 0 | Status: SHIPPED | OUTPATIENT
Start: 2018-07-30 | End: 2018-08-06

## 2018-07-30 NOTE — ED TRIAGE NOTES
Pt states \"I think I was bit by a spider\" today. Pt states she \"Feels a stinging\". Uncertain if she has any visible bites.

## 2018-07-30 NOTE — ED PROVIDER NOTES
Patient is a 76 y.o. female presenting with Insect Bite. The history is provided by the patient. Insect Bite   This is a new problem. The current episode started 1 to 2 hours ago. The problem occurs constantly. The problem has not changed since onset. Pertinent negatives include no chest pain, no abdominal pain, no headaches and no shortness of breath. Nothing aggravates the symptoms. Nothing relieves the symptoms. She has tried nothing for the symptoms. Pt was sitting outside and felt a stinging in her right upper back and then saw a spider crawl over her shoulder that she knocked off. She is still feeling a \"slight sting, but no real pain\". She denies seeing a bite site, itching, swelling, drainage, fever, chills, numbness, tingling, weakness, dizziness, n/v/d, or any other complaints at this time. Past Medical History:   Diagnosis Date    Hip pain, left posterior        Past Surgical History:   Procedure Laterality Date    HX HEART CATHETERIZATION  3/23/2009    HX HYSTERECTOMY  1997         Family History:   Problem Relation Age of Onset    Diabetes Father     Cancer Father      stomach       Social History     Social History    Marital status: SINGLE     Spouse name: N/A    Number of children: N/A    Years of education: N/A     Occupational History    Not on file. Social History Main Topics    Smoking status: Never Smoker    Smokeless tobacco: Not on file    Alcohol use No    Drug use: No    Sexual activity: Not on file     Other Topics Concern    Not on file     Social History Narrative         ALLERGIES: Codeine; Morphine; and Sulfa (sulfonamide antibiotics)    Review of Systems   Constitutional: Negative for chills and fever. HENT: Negative for ear pain, rhinorrhea and sore throat. Eyes: Negative for pain and redness. Respiratory: Negative for cough and shortness of breath. Cardiovascular: Negative for chest pain.    Gastrointestinal: Negative for abdominal pain, constipation, diarrhea, nausea and vomiting. Genitourinary: Negative for dysuria. Musculoskeletal: Negative for arthralgias. Skin: Positive for color change. Neurological: Negative for dizziness, light-headedness and headaches. Psychiatric/Behavioral: Negative. All other systems reviewed and are negative. Vitals:    07/30/18 1448   BP: 112/68   Pulse: 78   Resp: 12   Temp: 97.8 °F (36.6 °C)   SpO2: 98%   Weight: 95.3 kg (210 lb)   Height: 5' 6\" (1.676 m)            Physical Exam   Constitutional: She is oriented to person, place, and time. She appears well-developed and well-nourished. No distress. HENT:   Head: Normocephalic and atraumatic. Right Ear: Tympanic membrane, external ear and ear canal normal.   Left Ear: Tympanic membrane, external ear and ear canal normal.   Nose: Nose normal.   Mouth/Throat: Oropharynx is clear and moist and mucous membranes are normal.   Eyes: Conjunctivae and EOM are normal. Pupils are equal, round, and reactive to light. Neck: Normal range of motion. Cardiovascular: Normal rate, regular rhythm, normal heart sounds and intact distal pulses. Exam reveals no gallop and no friction rub. No murmur heard. Pulmonary/Chest: Effort normal and breath sounds normal. No respiratory distress. She has no wheezes. She has no rales. Abdominal: Soft. Bowel sounds are normal. There is no tenderness. Musculoskeletal: Normal range of motion. Back:    Neurological: She is alert and oriented to person, place, and time. Skin: Skin is warm and dry. She is not diaphoretic. Psychiatric: She has a normal mood and affect. Her behavior is normal. Judgment and thought content normal.   Nursing note and vitals reviewed.        MDM  Number of Diagnoses or Management Options  Nonvenomous insect bite of right side of back with infection, initial encounter: new and requires workup  Spider bite wound, accidental or unintentional, initial encounter: new and requires workup  Diagnosis management comments: DDx: eczema/allergic dermatitis/contact dermatitis, erysipelas, bug bites, abscess, cellulitis, viral exanthem, scabies, Scarlet fever rash, Fifth disease, measles, rubella, rubeola, skin eruption associated with life-threatening condition    Exam c/w possible bite with pustule, vs small area of cellulitis developing. No definitive palpable abscess requiring incision and drainage at this time. Will rx clindamycin to cover for MRSA, have pt use warm compresses, f/u with PCP in 2 days for a wound check. Pt given strict ED return precautions. Pt results have been reviewed with the patient and any family present. They have been counseled regarding diagnosis, treatment, and plan. They verbally convey understanding and agreement of the signs, symptoms, diagnosis, treatment and prognosis and additionally agrees to follow up as discussed. They also agree with the care-plan and convey that all of their questions have been answered. I have also provided discharge instructions for them that include: educational information regarding their diagnosis and treatment, and list of reasons why they would want to return to the ED prior to their follow-up appointment, should their condition change. Rudy Mendes PA-C  3:03 PM        Amount and/or Complexity of Data Reviewed  Review and summarize past medical records: yes  Discuss the patient with other providers: yes    Risk of Complications, Morbidity, and/or Mortality  Presenting problems: low  Diagnostic procedures: low  Management options: low    Patient Progress  Patient progress: stable        ED Course       Procedures    Diagnosis:   1. Spider bite wound, accidental or unintentional, initial encounter    2. Nonvenomous insect bite of right side of back with infection, initial encounter          Disposition: Discharge to home.      Follow-up Information     Follow up With Details Comments Contact Info    [facility] EMERGENCY DEPT  As needed, If symptoms worsen [address] Rema Miller MD Go in 2 days  [address] [facility]  [address] [facility]            Patient's Medications   Start Taking    CLINDAMYCIN (CLEOCIN) 300 MG CAPSULE    Take 1 Cap by mouth four (4) times daily for 7 days. Continue Taking    DULOXETINE (CYMBALTA) 30 MG CAPSULE    Take 1 cap po with dinner    TRAMADOL (ULTRAM) 50 MG TABLET    Take 1 Tab by mouth every six (6) hours as needed for Pain. Max Daily Amount: 200 mg.    These Medications have changed    No medications on file   Stop Taking    METHYLPREDNISOLONE (MEDROL DOSEPACK) 4 MG TABLET    Per dose pack instructions

## 2019-06-01 ENCOUNTER — APPOINTMENT (OUTPATIENT)
Dept: GENERAL RADIOLOGY | Age: 69
End: 2019-06-01
Attending: PHYSICIAN ASSISTANT
Payer: MEDICARE

## 2019-06-01 ENCOUNTER — HOSPITAL ENCOUNTER (EMERGENCY)
Age: 69
Discharge: HOME | End: 2019-06-01
Attending: EMERGENCY MEDICINE | Admitting: EMERGENCY MEDICINE
Payer: MEDICARE

## 2019-06-01 VITALS
HEART RATE: 60 BPM | TEMPERATURE: 98 F | RESPIRATION RATE: 18 BRPM | BODY MASS INDEX: 34.07 KG/M2 | OXYGEN SATURATION: 96 % | WEIGHT: 212 LBS | SYSTOLIC BLOOD PRESSURE: 138 MMHG | HEIGHT: 66 IN | DIASTOLIC BLOOD PRESSURE: 72 MMHG

## 2019-06-01 DIAGNOSIS — M25.562 ACUTE PAIN OF LEFT KNEE: Primary | ICD-10-CM

## 2019-06-01 PROCEDURE — 73564 X-RAY EXAM KNEE 4 OR MORE: CPT

## 2019-06-01 PROCEDURE — 99283 EMERGENCY DEPT VISIT LOW MDM: CPT

## 2019-06-01 PROCEDURE — 74011250637 HC RX REV CODE- 250/637: Performed by: PHYSICIAN ASSISTANT

## 2019-06-01 RX ORDER — TRAMADOL HYDROCHLORIDE 50 MG/1
50 TABLET ORAL
Status: COMPLETED | OUTPATIENT
Start: 2019-06-01 | End: 2019-06-01

## 2019-06-01 RX ORDER — TRAMADOL HYDROCHLORIDE 50 MG/1
50 TABLET ORAL
Qty: 8 TAB | Refills: 0 | Status: SHIPPED | OUTPATIENT
Start: 2019-06-01 | End: 2019-06-04

## 2019-06-01 RX ADMIN — TRAMADOL HYDROCHLORIDE 50 MG: 50 TABLET, FILM COATED ORAL at 11:56

## 2019-06-01 NOTE — ED NOTES
I have reviewed discharge instructions with the patient. The patient verbalized understanding. Patient armband removed and shredded. Pt accompanied by family member who is driving. Pt was warned not to drive while taking Tramadol; she verbalized understanding.

## 2019-06-01 NOTE — ED TRIAGE NOTES
Pt reports onset of left medial knee pain & swelling 2 days ago. States she shifted herself in the bed and felt the pain which has progressively worsened. Taking Tylenol for pain with minimal relief. States pain is worsened with weight bearing.

## 2019-06-01 NOTE — ED PROVIDER NOTES
EMERGENCY DEPARTMENT HISTORY AND PHYSICAL EXAM    Date: 6/1/2019  Patient Name: Suni Swanson    History of Presenting Illness     Chief Complaint   Patient presents with    Knee Pain         History Provided By: patient      Additional History (Context): Erenest Goodness is a pleasant 51-year-old female complaining of left knee pain for the past 2 days. Patient states pain is located to the anterior and medial knee worse with range of motion and with ambulation. No noted injury, denies direct blow or trauma. States she has had some pain in this knee before but recently has been worse. No associated symptoms. No numbness tingling, lower leg or ankle pain/swelling. PCP: Aditya Choi MD        Past History     Past Medical History:  Past Medical History:   Diagnosis Date    Hip pain, left posterior        Past Surgical History:  Past Surgical History:   Procedure Laterality Date    HX HEART CATHETERIZATION  3/23/2009    HX HYSTERECTOMY  1997       Family History:  Family History   Problem Relation Age of Onset    Diabetes Father     Cancer Father         stomach       Social History:  Social History     Tobacco Use    Smoking status: Never Smoker    Smokeless tobacco: Never Used   Substance Use Topics    Alcohol use: No    Drug use: No       Allergies: Allergies   Allergen Reactions    Codeine Itching    Morphine Itching    Sulfa (Sulfonamide Antibiotics) Swelling         Review of Systems       Review of Systems   Constitutional: Negative for chills and fever. HENT: Negative for nasal congestion, sore throat, rhinorrhea  Eyes: Negative. Respiratory: pos cough and negative for shortness of breath. Cardiovascular: Negative for chest pain and palpitations. Gastrointestinal: Negative for abdominal pain, constipation, diarrhea, nausea and vomiting. Genitourinary: Negative. Negative for difficulty urinating and flank pain. Musculoskeletal: Negative for back pain.   Positive for knee pain negative for gait problem and neck pain. Skin: Negative. Allergic/Immunologic: Negative. Neurological: Negative for dizziness, weakness, numbness and headaches. Psychiatric/Behavioral: Negative. All other systems reviewed and are negative. All Other Systems Negative  Physical Exam     Vitals:    06/01/19 1104   BP: 138/72   Pulse: 60   Resp: 18   Temp: 98 °F (36.7 °C)   SpO2: 96%   Weight: 96.2 kg (212 lb)   Height: 5' 6\" (1.676 m)     Physical Exam   Constitutional: She is oriented to person, place, and time. She appears well-developed and well-nourished. No distress. NAD, well hydrated, non toxic     HENT:   Head: Normocephalic and atraumatic. Nose: Nose normal.   Mouth/Throat: Oropharynx is clear and moist. No oropharyngeal exudate. Eyes: Pupils are equal, round, and reactive to light. Conjunctivae and EOM are normal.   Neck: Normal range of motion. Neck supple. Cardiovascular: Normal rate, regular rhythm and normal heart sounds. No murmur heard. Pulmonary/Chest: Effort normal and breath sounds normal. No respiratory distress. She has no wheezes. She has no rales. Abdominal: Soft. She exhibits no distension. There is no tenderness. There is no guarding. Musculoskeletal: Normal range of motion. Left knee: She exhibits effusion. She exhibits normal range of motion, no swelling, no ecchymosis, no deformity, no laceration, no erythema, normal alignment, no LCL laxity, normal patellar mobility, no bony tenderness, normal meniscus and no MCL laxity. Tenderness found. Medial joint line tenderness noted. Lymphadenopathy:     She has no cervical adenopathy. Neurological: She is alert and oriented to person, place, and time. No cranial nerve deficit. Coordination normal.   Skin: Skin is warm. No rash noted. She is not diaphoretic. Psychiatric: She has a normal mood and affect. Her behavior is normal.   Nursing note and vitals reviewed.         Diagnostic Study Results Labs -   No results found for this or any previous visit (from the past 12 hour(s)). Radiologic Studies -   XR KNEE LT MIN 4 V    (Results Pending)     CT Results  (Last 48 hours)    None        CXR Results  (Last 48 hours)    None            Medical Decision Making   I am the first provider for this patient. I reviewed the vital signs, available nursing notes, past medical history, past surgical history, family history and social history. Vital Signs-Reviewed the patient's vital signs. Records Reviewed: Nursing notes, old medical records and any previous labs, imaging, visits, consultations pertinent to patient care    Procedures:  Procedures    Provider Notes (Medical Decision Making):     Xray negative for acute process. Patient has arthritis in this knee, small joint effusion. Will offer Ace wrap and crutches. And offer orthopedist for follow-up. appropriate for out pt management. Will discuss supportive treatment, NSAIDS, RICE and ortho f/u. Discussed proper way to take medications. Discussed treatment plan, return precautions, symptomatic relief, and expected time to improvement. All questions answered. Patient is stable for discharge and outpatient management. MED RECONCILIATION:  No current facility-administered medications for this encounter. No current outpatient medications on file. Disposition:  home    DISCHARGE NOTE:     Pt has been reexamined. Patient has no new complaints, changes, or physical findings. Care plan outlined and precautions discussed. Discussed proper way to take medications. Discussed treatment plan, return precautions, symptomatic relief, and expected time to improvement. All questions answered. Patient is stable for discharge and outpatient management. Patient is ready to go home.     Follow-up Information     Follow up With Specialties Details Why Contact [facility] EMERGENCY DEPT Emergency Medicine   [address] [place] [id number]  [facility], [facility] Dhaval Jama MD [facility]   [id number] [place] [phone]      [address] Orthopedic Surgery Schedule an appointment as soon as possible for a visit  [address]  [phone]          There are no discharge medications for this patient. Diagnosis     Clinical Impression:   1.  Acute pain of left knee

## 2019-06-01 NOTE — DISCHARGE INSTRUCTIONS
Patient Education        Knee Pain or Injury: Care Instructions  Your Care Instructions    Injuries are a common cause of knee problems. Sudden (acute) injuries may be caused by a direct blow to the knee. They can also be caused by abnormal twisting, bending, or falling on the knee. Pain, bruising, or swelling may be severe, and may start within minutes of the injury. Overuse is another cause of knee pain. Other causes are climbing stairs, kneeling, and other activities that use the knee. Everyday wear and tear, especially as you get older, also can cause knee pain. Rest, along with home treatment, often relieves pain and allows your knee to heal. If you have a serious knee injury, you may need tests and treatment. Follow-up care is a key part of your treatment and safety. Be sure to make and go to all appointments, and call your doctor if you are having problems. It's also a good idea to know your test results and keep a list of the medicines you take. How can you care for yourself at home? · Be safe with medicines. Read and follow all instructions on the label. ? If the doctor gave you a prescription medicine for pain, take it as prescribed. ? If you are not taking a prescription pain medicine, ask your doctor if you can take an over-the-counter medicine. · Rest and protect your knee. Take a break from any activity that may cause pain. · Put ice or a cold pack on your knee for 10 to 20 minutes at a time. Put a thin cloth between the ice and your skin. · Prop up a sore knee on a pillow when you ice it or anytime you sit or lie down for the next 3 days. Try to keep it above the level of your heart. This will help reduce swelling. · If your knee is not swollen, you can put moist heat, a heating pad, or a warm cloth on your knee. · If your doctor recommends an elastic bandage, sleeve, or other type of support for your knee, wear it as directed.   · Follow your doctor's instructions about how much weight you can put on your leg. Use a cane, crutches, or a walker as instructed. · Follow your doctor's instructions about activity during your healing process. If you can do mild exercise, slowly increase your activity. · Reach and stay at a healthy weight. Extra weight can strain the joints, especially the knees and hips, and make the pain worse. Losing even a few pounds may help. When should you call for help? Call 911 anytime you think you may need emergency care. For example, call if:    · You have symptoms of a blood clot in your lung (called a pulmonary embolism). These may include:  ? Sudden chest pain. ? Trouble breathing. ? Coughing up blood.    Call your doctor now or seek immediate medical care if:    · You have severe or increasing pain.     · Your leg or foot turns cold or changes color.     · You cannot stand or put weight on your knee.     · Your knee looks twisted or bent out of shape.     · You cannot move your knee.     · You have signs of infection, such as:  ? Increased pain, swelling, warmth, or redness. ? Red streaks leading from the knee. ? Pus draining from a place on your knee. ? A fever.     · You have signs of a blood clot in your leg (called a deep vein thrombosis), such as:  ? Pain in your calf, back of the knee, thigh, or groin. ? Redness and swelling in your leg or groin.    Watch closely for changes in your health, and be sure to contact your doctor if:    · You have tingling, weakness, or numbness in your knee.     · You have any new symptoms, such as swelling.     · You have bruises from a knee injury that last longer than 2 weeks.     · You do not get better as expected. Where can you learn more? Go to http://thai-lesly.info/. Enter K195 in the search box to learn more about \"Knee Pain or Injury: Care Instructions. \"  Current as of: September 23, 2018  Content Version: 11.9  © 4013-9104 [employer].  Care instructions adapted under license by [employer] (which disclaims liability or warranty for this information). If you have questions about a medical condition or this instruction, always ask your healthcare professional. [address] any warranty or liability for your use of this information. Patient Education        Joint Pain: Care Instructions  Your Care Instructions    Many people have small aches and pains from overuse or injury to muscles and joints. Joint injuries often happen during sports or recreation, work tasks, or projects around the home. An overuse injury can happen when you put too much stress on a joint or when you do an activity that stresses the joint over and over, such as using the computer or rowing a boat. You can take action at home to help your muscles and joints get better. You should feel better in 1 to 2 weeks, but it can take 3 months or more to heal completely. Follow-up care is a key part of your treatment and safety. Be sure to make and go to all appointments, and call your doctor if you are having problems. It's also a good idea to know your test results and keep a list of the medicines you take. How can you care for yourself at home? · Do not put weight on the injured joint for at least a day or two. · For the first day or two after an injury, do not take hot showers or baths, and do not use hot packs. The heat could make swelling worse. · Put ice or a cold pack on the sore joint for 10 to 20 minutes at a time. Try to do this every 1 to 2 hours for the next 3 days (when you are awake) or until the swelling goes down. Put a thin cloth between the ice and your skin. · Wrap the injury in an elastic bandage. Do not wrap it too tightly because this can cause more swelling. · Prop up the sore joint on a pillow when you ice it or anytime you sit or lie down during the next 3 days. Try to keep it above the level of your heart. This will help reduce swelling.   · Take an over-the-counter pain medicine, such as acetaminophen (Tylenol), ibuprofen (Advil, Motrin), or naproxen (Aleve). Read and follow all instructions on the label. · After 1 or 2 days of rest, begin moving the joint gently. While the joint is still healing, you can begin to exercise using activities that do not strain or hurt the painful joint. When should you call for help? Call your doctor now or seek immediate medical care if:    · You have signs of infection, such as:  ? Increased pain, swelling, warmth, and redness. ? Red streaks leading from the joint. ? A fever.    Watch closely for changes in your health, and be sure to contact your doctor if:    · Your movement or symptoms are not getting better after 1 to 2 weeks of home treatment. Where can you learn more? Go to http://thai-lesly.info/. Enter P205 in the search box to learn more about \"Joint Pain: Care Instructions. \"  Current as of: September 20, 2018  Content Version: 11.9  © 9871-3027 [employer]. Care instructions adapted under license by [employer] (which disclaims liability or warranty for this information). If you have questions about a medical condition or this instruction, always ask your healthcare professional. [address] any warranty or liability for your use of this information.

## 2022-07-16 ENCOUNTER — HOSPITAL ENCOUNTER (EMERGENCY)
Age: 72
Discharge: HOME | End: 2022-07-17
Attending: STUDENT IN AN ORGANIZED HEALTH CARE EDUCATION/TRAINING PROGRAM
Payer: MEDICARE

## 2022-07-16 ENCOUNTER — APPOINTMENT (OUTPATIENT)
Dept: GENERAL RADIOLOGY | Age: 72
End: 2022-07-16
Attending: STUDENT IN AN ORGANIZED HEALTH CARE EDUCATION/TRAINING PROGRAM
Payer: MEDICARE

## 2022-07-16 VITALS
HEIGHT: 66 IN | WEIGHT: 215 LBS | OXYGEN SATURATION: 97 % | SYSTOLIC BLOOD PRESSURE: 127 MMHG | HEART RATE: 67 BPM | RESPIRATION RATE: 18 BRPM | TEMPERATURE: 97.6 F | BODY MASS INDEX: 34.55 KG/M2 | DIASTOLIC BLOOD PRESSURE: 72 MMHG

## 2022-07-16 DIAGNOSIS — S80.02XA CONTUSION OF LEFT KNEE, INITIAL ENCOUNTER: Primary | ICD-10-CM

## 2022-07-16 PROCEDURE — 73564 X-RAY EXAM KNEE 4 OR MORE: CPT

## 2022-07-16 PROCEDURE — 99283 EMERGENCY DEPT VISIT LOW MDM: CPT

## 2022-07-17 NOTE — ED PROVIDER NOTES
EMERGENCY DEPARTMENT HISTORY AND PHYSICAL EXAM    I have evaluated the patient at 11:41 PM      Date: 7/16/2022  Patient Name: Cesar Berger    History of Presenting Illness     Chief Complaint   Patient presents with    Knee Injury         History Provided By: Patient  Location/Duration/Severity/Modifying factors   This is a 67year old female sending to the emergency department for evaluation of left knee pain. Patient was walking up some steps when she tripped and struck the front of her knee on the corner of a stair. She was still ambulatory afterward. Iced it and took ibuprofen but was still having some pain and swelling and wanted it evaluated here in the ER. Denies any neurovascular deficits distally. So blood bear weight on the extremity          PCP: Noel English MD        Past History     Past Medical History:  Past Medical History:   Diagnosis Date    Hip pain, left posterior        Past Surgical History:  Past Surgical History:   Procedure Laterality Date    HX HEART CATHETERIZATION  3/23/2009    HX HYSTERECTOMY  1997       Family History:  Family History   Problem Relation Age of Onset    Diabetes Father     Cancer Father         stomach       Social History:  Social History     Tobacco Use    Smoking status: Never Smoker    Smokeless tobacco: Never Used   Substance Use Topics    Alcohol use: No    Drug use: No       Allergies: Allergies   Allergen Reactions    Codeine Itching    Morphine Itching    Sulfa (Sulfonamide Antibiotics) Swelling         Review of Systems       Review of Systems   Constitutional: Negative for activity change, chills, diaphoresis, fatigue and fever. Respiratory: Negative for cough, chest tightness, shortness of breath, wheezing and stridor. Cardiovascular: Negative for chest pain and palpitations. Gastrointestinal: Negative for abdominal distention, abdominal pain, constipation, diarrhea, nausea and vomiting.    Musculoskeletal: Positive for arthralgias (left Knee pain). Negative for back pain, joint swelling and myalgias. Skin: Negative for rash and wound. Neurological: Negative for dizziness, weakness, light-headedness, numbness and headaches. Psychiatric/Behavioral: Negative for agitation. The patient is not nervous/anxious. Physical Exam     Visit Vitals  /72 (BP 1 Location: Left arm, BP Patient Position: At rest)   Pulse 67   Temp 97.6 °F (36.4 °C)   Resp 18   Ht 5' 6\" (1.676 m)   Wt 97.5 kg (215 lb)   SpO2 97%   BMI 34.70 kg/m²         Physical Exam  Constitutional:       General: She is not in acute distress. Appearance: She is not toxic-appearing. HENT:      Head: Normocephalic and atraumatic. Cardiovascular:      Rate and Rhythm: Normal rate and regular rhythm. Heart sounds: Normal heart sounds. No murmur heard. No friction rub. No gallop. Pulmonary:      Effort: Pulmonary effort is normal.      Breath sounds: Normal breath sounds. Abdominal:      General: There is no distension. Palpations: Abdomen is soft. There is no mass. Tenderness: There is no abdominal tenderness. There is no guarding. Hernia: No hernia is present. Musculoskeletal:         General: Swelling, tenderness and signs of injury present. No deformity. Comments: Anterior knee tenderness. Knee stable. Negative anterior and posterior drawer. Negative mcmurrays, patella stable   Skin:     General: Skin is warm and dry. Findings: No rash. Neurological:      General: No focal deficit present. Mental Status: She is alert and oriented to person, place, and time. Sensory: No sensory deficit. Motor: No weakness. Psychiatric:         Mood and Affect: Mood normal.           Diagnostic Study Results     Labs -  No results found for this or any previous visit (from the past 12 hour(s)).     Radiologic Studies -   XR KNEE LT MIN 4 V    (Results Pending)         Medical Decision Making   I am the first provider for this patient. I reviewed the vital signs, available nursing notes, past medical history, past surgical history, family history and social history. Vital Signs-Reviewed the patient's vital signs. EKG: \    Records Reviewed: Nursing Notes and Old Medical Records (Time of Review: 11:41 PM)    ED Course: Progress Notes, Reevaluation, and Consults:         Provider Notes (Medical Decision Making):   MDM  Number of Diagnoses or Management Options  Contusion of left knee, initial encounter  Diagnosis management comments: 77-year-old female presenting to the emergency department for evaluation of left knee pain and swelling after an injury. She is ambulatory. Neurovascularly intact distally. He has full range of motion and is stable on exam.  X-ray shows no evidence of fracture or dislocation. Likely contused. Patient will be placed in knee brace. Instructed on continued conservative management at home. Return precautions follow-up advised. Patient verbalizes understanding and agreement with plan. Procedures    Critical Care Time:       Diagnosis     Clinical Impression:   1. Contusion of left knee, initial encounter        Disposition: home    Follow-up Information     Follow up With Specialties Details Why [place] [address] EMERGENCY DEPT Emergency Medicine  If symptoms worsen [address]    Lajune Dubin, MD [facility] Medicine Call   [address]  [phone]      Susan Ortez MD Orthopedic Surgery Call  As needed [facility] [place] [id number]  [phone]             Patient's Medications    No medications on file     Disclaimer: Sections of this note are dictated using utilizing voice recognition software. Minor typographical errors may be present. If questions arise, please do not hesitate to contact me or call our department.

## 2022-07-17 NOTE — DISCHARGE INSTRUCTIONS
Continue ice and tylenol/motrin. Wear the knee brace. Follow up withy our primary care doctor. Return to the emergency department for any new or worsening symptoms.

## 2022-09-29 ENCOUNTER — APPOINTMENT (OUTPATIENT)
Dept: CT IMAGING | Age: 72
End: 2022-09-29
Attending: EMERGENCY MEDICINE
Payer: MEDICARE

## 2022-09-29 ENCOUNTER — HOSPITAL ENCOUNTER (EMERGENCY)
Age: 72
Discharge: HOME | End: 2022-09-30
Attending: EMERGENCY MEDICINE
Payer: MEDICARE

## 2022-09-29 DIAGNOSIS — K63.89 EPIPLOIC APPENDAGITIS: Primary | ICD-10-CM

## 2022-09-29 LAB
ALBUMIN SERPL-MCNC: 3.8 G/DL (ref 3.4–5)
ALBUMIN/GLOB SERPL: 1 {RATIO} (ref 0.8–1.7)
ALP SERPL-CCNC: 66 U/L (ref 45–117)
ALT SERPL-CCNC: 26 U/L (ref 13–56)
ANION GAP SERPL CALC-SCNC: 4 MMOL/L (ref 3–18)
APPEARANCE UR: CLEAR
AST SERPL-CCNC: 35 U/L (ref 10–38)
BACTERIA URNS QL MICRO: ABNORMAL /HPF
BASOPHILS # BLD: 0.1 K/UL (ref 0–0.1)
BASOPHILS NFR BLD: 1 % (ref 0–2)
BILIRUB SERPL-MCNC: 1 MG/DL (ref 0.2–1)
BILIRUB UR QL: NEGATIVE
BUN SERPL-MCNC: 10 MG/DL (ref 7–18)
BUN/CREAT SERPL: 13 (ref 12–20)
CALCIUM SERPL-MCNC: 9 MG/DL (ref 8.5–10.1)
CHLORIDE SERPL-SCNC: 105 MMOL/L (ref 100–111)
CO2 SERPL-SCNC: 30 MMOL/L (ref 21–32)
COLOR UR: ABNORMAL
CREAT SERPL-MCNC: 0.75 MG/DL (ref 0.6–1.3)
DIFFERENTIAL METHOD BLD: ABNORMAL
EOSINOPHIL # BLD: 0.1 K/UL (ref 0–0.4)
EOSINOPHIL NFR BLD: 1 % (ref 0–5)
EPITH CASTS URNS QL MICRO: ABNORMAL /LPF (ref 0–5)
ERYTHROCYTE [DISTWIDTH] IN BLOOD BY AUTOMATED COUNT: 13.6 % (ref 11.6–14.5)
GLOBULIN SER CALC-MCNC: 4 G/DL (ref 2–4)
GLUCOSE SERPL-MCNC: 91 MG/DL (ref 74–99)
GLUCOSE UR STRIP.AUTO-MCNC: NEGATIVE MG/DL
HCT VFR BLD AUTO: 40.7 % (ref 35–45)
HGB BLD-MCNC: 13.2 G/DL (ref 12–16)
HGB UR QL STRIP: ABNORMAL
IMM GRANULOCYTES # BLD AUTO: 0.1 K/UL (ref 0–0.04)
IMM GRANULOCYTES NFR BLD AUTO: 1 % (ref 0–0.5)
KETONES UR QL STRIP.AUTO: 40 MG/DL
LEUKOCYTE ESTERASE UR QL STRIP.AUTO: ABNORMAL
LIPASE SERPL-CCNC: 158 U/L (ref 73–393)
LYMPHOCYTES # BLD: 2.6 K/UL (ref 0.9–3.6)
LYMPHOCYTES NFR BLD: 26 % (ref 21–52)
MAGNESIUM SERPL-MCNC: 2.3 MG/DL (ref 1.6–2.6)
MCH RBC QN AUTO: 28.6 PG (ref 24–34)
MCHC RBC AUTO-ENTMCNC: 32.4 G/DL (ref 31–37)
MCV RBC AUTO: 88.3 FL (ref 78–100)
MONOCYTES # BLD: 1.1 K/UL (ref 0.05–1.2)
MONOCYTES NFR BLD: 10 % (ref 3–10)
NEUTS SEG # BLD: 6.3 K/UL (ref 1.8–8)
NEUTS SEG NFR BLD: 62 % (ref 40–73)
NITRITE UR QL STRIP.AUTO: NEGATIVE
NRBC # BLD: 0 K/UL (ref 0–0.01)
NRBC BLD-RTO: 0 PER 100 WBC
PH UR STRIP: 6 [PH] (ref 5–8)
PLATELET # BLD AUTO: 257 K/UL (ref 135–420)
PMV BLD AUTO: 11.6 FL (ref 9.2–11.8)
POTASSIUM SERPL-SCNC: 4.5 MMOL/L (ref 3.5–5.5)
PROT SERPL-MCNC: 7.8 G/DL (ref 6.4–8.2)
PROT UR STRIP-MCNC: NEGATIVE MG/DL
RBC # BLD AUTO: 4.61 M/UL (ref 4.2–5.3)
RBC #/AREA URNS HPF: ABNORMAL /HPF (ref 0–5)
SODIUM SERPL-SCNC: 139 MMOL/L (ref 136–145)
SP GR UR REFRACTOMETRY: 1.02 (ref 1–1.03)
TROPONIN-HIGH SENSITIVITY: 5 NG/L (ref 0–54)
UROBILINOGEN UR QL STRIP.AUTO: 1 EU/DL (ref 0.2–1)
WBC # BLD AUTO: 10.2 K/UL (ref 4.6–13.2)
WBC URNS QL MICRO: ABNORMAL /HPF (ref 0–4)

## 2022-09-29 PROCEDURE — 83690 ASSAY OF LIPASE: CPT

## 2022-09-29 PROCEDURE — 81001 URINALYSIS AUTO W/SCOPE: CPT

## 2022-09-29 PROCEDURE — 74011000636 HC RX REV CODE- 636: Performed by: EMERGENCY MEDICINE

## 2022-09-29 PROCEDURE — 83735 ASSAY OF MAGNESIUM: CPT

## 2022-09-29 PROCEDURE — 85025 COMPLETE CBC W/AUTO DIFF WBC: CPT

## 2022-09-29 PROCEDURE — 80053 COMPREHEN METABOLIC PANEL: CPT

## 2022-09-29 PROCEDURE — 74177 CT ABD & PELVIS W/CONTRAST: CPT

## 2022-09-29 PROCEDURE — 93005 ELECTROCARDIOGRAM TRACING: CPT

## 2022-09-29 PROCEDURE — 84484 ASSAY OF TROPONIN QUANT: CPT

## 2022-09-30 VITALS
WEIGHT: 222 LBS | DIASTOLIC BLOOD PRESSURE: 74 MMHG | SYSTOLIC BLOOD PRESSURE: 154 MMHG | BODY MASS INDEX: 35.68 KG/M2 | TEMPERATURE: 97 F | HEIGHT: 66 IN | OXYGEN SATURATION: 98 % | RESPIRATION RATE: 15 BRPM | HEART RATE: 73 BPM

## 2022-09-30 PROCEDURE — 96374 THER/PROPH/DIAG INJ IV PUSH: CPT

## 2022-09-30 PROCEDURE — 99285 EMERGENCY DEPT VISIT HI MDM: CPT

## 2022-09-30 PROCEDURE — 74011000636 HC RX REV CODE- 636: Performed by: EMERGENCY MEDICINE

## 2022-09-30 PROCEDURE — 74011250636 HC RX REV CODE- 250/636: Performed by: EMERGENCY MEDICINE

## 2022-09-30 RX ORDER — KETOROLAC TROMETHAMINE 15 MG/ML
15 INJECTION, SOLUTION INTRAMUSCULAR; INTRAVENOUS EVERY 6 HOURS
Status: DISCONTINUED | OUTPATIENT
Start: 2022-09-30 | End: 2022-09-30 | Stop reason: HOSPADM

## 2022-09-30 RX ORDER — IBUPROFEN 600 MG/1
600 TABLET ORAL
Qty: 30 TABLET | Refills: 0 | Status: SHIPPED | OUTPATIENT
Start: 2022-09-30

## 2022-09-30 RX ADMIN — IOPAMIDOL 100 ML: 612 INJECTION, SOLUTION INTRAVENOUS at 00:14

## 2022-09-30 RX ADMIN — KETOROLAC TROMETHAMINE 15 MG: 15 INJECTION, SOLUTION INTRAMUSCULAR; INTRAVENOUS at 02:08

## 2022-09-30 NOTE — ED NOTES
Received nursing report from Adrian tiwari, [facility]. Patient A/O x 4, resting comfortably on stretcher. Patient left quadrant abdominal pain but states when it comes it's sharp. Patient denies other complaints at this time. Awaiting evaluation from provider. No acute distress noted.

## 2022-09-30 NOTE — ED PROVIDER NOTES
Abdominal Pain      70-year-old female who presents to ER with a 2 to 3-day history of a left lower quadrant abdominal pain has been persistent. No nausea vomiting diarrhea. History of hysterectomy in the past    Past Medical History:   Diagnosis Date    Hip pain, left posterior        Past Surgical History:   Procedure Laterality Date    HX HEART CATHETERIZATION  3/23/2009    HX HYSTERECTOMY  1997         Family History:   Problem Relation Age of Onset    Diabetes Father     Cancer Father         stomach       Social History     Socioeconomic History    Marital status: SINGLE     Spouse name: Not on file    Number of children: Not on file    Years of education: Not on file    Highest education level: Not on file   Occupational History    Not on file   Tobacco Use    Smoking status: Never    Smokeless tobacco: Never   Substance and Sexual Activity    Alcohol use: No    Drug use: No    Sexual activity: Not on file   Other Topics Concern    Not on file   Social History Narrative    Not on file     Social Determinants of Health     Financial Resource Strain: Not on file   Food Insecurity: Not on file   Transportation Needs: Not on file   Physical Activity: Not on file   Stress: Not on file   Social Connections: Not on file   Intimate Partner Violence: Not on file   Housing Stability: Not on file         ALLERGIES: Codeine, Morphine, and Sulfa (sulfonamide antibiotics)    Review of Systems   Constitutional: Negative. HENT: Negative. Eyes: Negative. Respiratory: Negative. Cardiovascular: Negative. Gastrointestinal:  Positive for abdominal pain (LLQ). Endocrine: Negative. Genitourinary: Negative. Musculoskeletal: Negative. Skin: Negative. Allergic/Immunologic: Negative. Neurological: Negative. Hematological: Negative. Psychiatric/Behavioral: Negative. All other systems reviewed and are negative.     Vitals:    09/29/22 1933   BP: (!) 141/94   Pulse: 73   Resp: 15   Temp: 98 °F (36.7 °C)   SpO2: 100%   Weight: 100.7 kg (222 lb)   Height: 5' 6\" (1.676 m)            Physical Exam  Vitals and nursing note reviewed. Constitutional:       General: She is not in acute distress. Appearance: She is well-developed. She is not diaphoretic. HENT:      Head: Normocephalic. Right Ear: External ear normal.      Left Ear: External ear normal.      Mouth/Throat:      Pharynx: No oropharyngeal exudate. Eyes:      General: No scleral icterus. Right eye: No discharge. Left eye: No discharge. Conjunctiva/sclera: Conjunctivae normal.      Pupils: Pupils are equal, round, and reactive to light. Neck:      Thyroid: No thyromegaly. Vascular: No JVD. Trachea: No tracheal deviation. Cardiovascular:      Rate and Rhythm: Normal rate and regular rhythm. Heart sounds: Normal heart sounds. No murmur heard. No friction rub. No gallop. Pulmonary:      Effort: Pulmonary effort is normal. No respiratory distress. Breath sounds: Normal breath sounds. No stridor. No wheezing or rales. Chest:      Chest wall: No tenderness. Abdominal:      General: Bowel sounds are normal. There is no distension. Palpations: Abdomen is soft. There is no mass. Tenderness: There is abdominal tenderness in the left lower quadrant. There is no guarding or rebound. Musculoskeletal:         General: No tenderness. Normal range of motion. Cervical back: Normal range of motion and neck supple. Lymphadenopathy:      Cervical: No cervical adenopathy. Skin:     General: Skin is warm and dry. Coloration: Skin is not pale. Findings: No erythema or rash. Neurological:      Mental Status: She is alert and oriented to person, place, and time. Cranial Nerves: No cranial nerve deficit. Motor: No abnormal muscle tone.       Coordination: Coordination normal.      Deep Tendon Reflexes: Reflexes normal.        MDM     Amount and/or Complexity of Data Reviewed  Clinical lab tests: reviewed  Tests in the radiology section of CPT®: reviewed  Tests in the medicine section of CPT®: reviewed           Patient diagnosis: Diverticulitis, diverticulosis, pancreatitis, UTI, kidney stone    Procedures    Lab tests: Interpreted by me    CT scan abdomen: Interpreted by me    Lab tests: interpreted by me    Urinalysis: Turbid by me    Hospital course: patient remained stable    Diagnosis: Abdominal pain    Disposition:  D/C home. F/U PCP in 2 days. Return to ER prn. Dictation disclaimer:  Please note that this dictation was completed with [employer], the computer voice recognition software. Quite often unanticipated grammatical, syntax, homophones, and other interpretive errors are inadvertently transcribed by the computer software. Please disregard these errors. Please excuse any errors that have escaped final proofreading.

## 2022-10-02 LAB
ATRIAL RATE: 70 BPM
CALCULATED P AXIS, ECG09: 66 DEGREES
CALCULATED R AXIS, ECG10: -2 DEGREES
CALCULATED T AXIS, ECG11: 18 DEGREES
DIAGNOSIS, 93000: NORMAL
P-R INTERVAL, ECG05: 160 MS
Q-T INTERVAL, ECG07: 418 MS
QRS DURATION, ECG06: 112 MS
QTC CALCULATION (BEZET), ECG08: 451 MS
VENTRICULAR RATE, ECG03: 70 BPM

## 2023-03-23 ENCOUNTER — HOSPITAL ENCOUNTER (EMERGENCY)
Facility: HOSPITAL | Age: 73
Discharge: HOME | End: 2023-03-23
Attending: EMERGENCY MEDICINE
Payer: MEDICARE

## 2023-03-23 ENCOUNTER — APPOINTMENT (OUTPATIENT)
Facility: HOSPITAL | Age: 73
End: 2023-03-23
Payer: MEDICARE

## 2023-03-23 VITALS
TEMPERATURE: 98.4 F | DIASTOLIC BLOOD PRESSURE: 94 MMHG | SYSTOLIC BLOOD PRESSURE: 170 MMHG | HEART RATE: 77 BPM | RESPIRATION RATE: 20 BRPM | OXYGEN SATURATION: 97 %

## 2023-03-23 DIAGNOSIS — U07.1 COVID-19: Primary | ICD-10-CM

## 2023-03-23 LAB
EKG ATRIAL RATE: 78 BPM
EKG DIAGNOSIS: NORMAL
EKG P AXIS: 70 DEGREES
EKG P-R INTERVAL: 168 MS
EKG Q-T INTERVAL: 384 MS
EKG QRS DURATION: 104 MS
EKG QTC CALCULATION (BAZETT): 437 MS
EKG R AXIS: 25 DEGREES
EKG T AXIS: 30 DEGREES
EKG VENTRICULAR RATE: 78 BPM
FLUAV AG NPH QL IA: NEGATIVE
FLUBV AG NOSE QL IA: NEGATIVE
SARS-COV-2 RDRP RESP QL NAA+PROBE: DETECTED
SOURCE: ABNORMAL

## 2023-03-23 PROCEDURE — 87635 SARS-COV-2 COVID-19 AMP PRB: CPT

## 2023-03-23 PROCEDURE — 99285 EMERGENCY DEPT VISIT HI MDM: CPT

## 2023-03-23 PROCEDURE — 93005 ELECTROCARDIOGRAM TRACING: CPT | Performed by: EMERGENCY MEDICINE

## 2023-03-23 PROCEDURE — 71046 X-RAY EXAM CHEST 2 VIEWS: CPT

## 2023-03-23 PROCEDURE — 87804 INFLUENZA ASSAY W/OPTIC: CPT

## 2023-03-23 PROCEDURE — 93010 ELECTROCARDIOGRAM REPORT: CPT | Performed by: INTERNAL MEDICINE

## 2023-03-23 ASSESSMENT — LIFESTYLE VARIABLES
HOW MANY STANDARD DRINKS CONTAINING ALCOHOL DO YOU HAVE ON A TYPICAL DAY: PATIENT DOES NOT DRINK
HOW OFTEN DO YOU HAVE A DRINK CONTAINING ALCOHOL: NEVER

## 2023-03-23 NOTE — ED NOTES
Pt in NAD, Discussed DC with the patient and all questions fully answered. Pt denies concerns at this time. Medication List        START taking these medications      nirmatrelvir/ritonavir 300/100 20 x 150 MG & 10 x 100MG Tbpk  Commonly known as: PAXLOVID  Take 3 tablets (two 150 mg nirmatrelvir and one 100 mg ritonavir tablets) by mouth every 12 hours for 5 days.             ASK your doctor about these medications      ibuprofen 600 MG tablet  Commonly known as: ADVIL;MOTRIN               Where to Get Your Medications        Information about where to get these medications is not yet available    Ask your nurse or doctor about these medications  nirmatrelvir/ritonavir 300/100 20 x 150 MG & 10 x 100MG Tbpk           [employer], RN  03/23/23 4392

## 2023-03-23 NOTE — ED TRIAGE NOTES
Pt is ambulatory to triage, presents with complaints of cough and scratchy throat that started yesterday. Pt states cough kept her up through the night. Pt states her chest and back hurts when she coughs.

## 2023-03-23 NOTE — ED PROVIDER NOTES
Tobacco Use: Never    Passive Exposure: Not on file   Alcohol Use: Not on file   Financial Resource Strain: Not on file   Food Insecurity: Not on file   Transportation Needs: Not on file   Physical Activity: Not on file   Stress: Not on file   Social Connections: Not on file   Intimate Partner Violence: Not on file   Depression: Not on file   Housing Stability: Not on file       Review of Systems     Negative except as listed above in HPI. Physical Exam     Vitals:    03/23/23 0728   Temp: 98.4 °F (36.9 °C)   TempSrc: Oral         Physical Exam  Vitals and nursing note reviewed. Constitutional:       Appearance: Normal appearance. HENT:      Head: Normocephalic and atraumatic. Right Ear: External ear normal.      Left Ear: External ear normal.      Nose: Nose normal.      Mouth/Throat:      Mouth: Mucous membranes are moist.      Pharynx: Oropharynx is clear. Eyes:      Extraocular Movements: Extraocular movements intact. Conjunctiva/sclera: Conjunctivae normal.      Pupils: Pupils are equal, round, and reactive to light. Cardiovascular:      Rate and Rhythm: Normal rate and regular rhythm. Pulses: Normal pulses. Heart sounds: Normal heart sounds. Pulmonary:      Effort: Pulmonary effort is normal.      Breath sounds: Wheezing present. Abdominal:      General: Abdomen is flat. Bowel sounds are normal.      Palpations: Abdomen is soft. Musculoskeletal:         General: Normal range of motion. Cervical back: Normal range of motion and neck supple. Skin:     General: Skin is warm and dry. Capillary Refill: Capillary refill takes less than 2 seconds. Neurological:      General: No focal deficit present. Mental Status: She is alert and oriented to person, place, and time. Psychiatric:         Mood and Affect: Mood normal.         Behavior: Behavior normal.         Thought Content:  Thought content normal.         Judgment: Judgment normal.         Diagnostic Study

## 2023-04-06 ENCOUNTER — APPOINTMENT (OUTPATIENT)
Facility: HOSPITAL | Age: 73
End: 2023-04-06
Payer: MEDICARE

## 2023-04-06 ENCOUNTER — HOSPITAL ENCOUNTER (EMERGENCY)
Facility: HOSPITAL | Age: 73
Discharge: HOME | End: 2023-04-06
Attending: EMERGENCY MEDICINE
Payer: MEDICARE

## 2023-04-06 VITALS
WEIGHT: 219 LBS | SYSTOLIC BLOOD PRESSURE: 145 MMHG | OXYGEN SATURATION: 97 % | TEMPERATURE: 98 F | HEART RATE: 73 BPM | DIASTOLIC BLOOD PRESSURE: 79 MMHG | RESPIRATION RATE: 18 BRPM | HEIGHT: 66 IN | BODY MASS INDEX: 35.2 KG/M2

## 2023-04-06 DIAGNOSIS — M25.462 KNEE EFFUSION, LEFT: Primary | ICD-10-CM

## 2023-04-06 DIAGNOSIS — M17.12 OSTEOARTHRITIS OF LEFT KNEE, UNSPECIFIED OSTEOARTHRITIS TYPE: ICD-10-CM

## 2023-04-06 PROCEDURE — 73590 X-RAY EXAM OF LOWER LEG: CPT

## 2023-04-06 PROCEDURE — 73562 X-RAY EXAM OF KNEE 3: CPT

## 2023-04-06 RX ORDER — KETOROLAC TROMETHAMINE 15 MG/ML
15 INJECTION, SOLUTION INTRAMUSCULAR; INTRAVENOUS
Status: DISCONTINUED | OUTPATIENT
Start: 2023-04-06 | End: 2023-04-06 | Stop reason: HOSPADM

## 2023-04-06 RX ORDER — METHYLPREDNISOLONE 4 MG/1
TABLET ORAL
Qty: 1 KIT | Refills: 0 | Status: SHIPPED | OUTPATIENT
Start: 2023-04-06 | End: 2023-04-12

## 2023-04-06 RX ORDER — TRAMADOL HYDROCHLORIDE 50 MG/1
50 TABLET ORAL EVERY 4 HOURS PRN
Qty: 8 TABLET | Refills: 0 | Status: SHIPPED | OUTPATIENT
Start: 2023-04-06 | End: 2023-04-09

## 2023-04-06 ASSESSMENT — ENCOUNTER SYMPTOMS
ABDOMINAL PAIN: 0
EYES NEGATIVE: 1
CHEST TIGHTNESS: 0

## 2023-04-06 NOTE — ED TRIAGE NOTES
Pt reports pain to left knee that started Sunday.  Pt states \"It's shoot from my left side of my left knee down my shin to my ankle.'

## 2023-04-06 NOTE — ED PROVIDER NOTES
lower leg: Edema present. Left lower leg: Edema present. Comments: Trace and symmetric edema to the bilateral lower extremities, pain to the joint line of the left knee   Skin:     General: Skin is warm and dry. Capillary Refill: Capillary refill takes less than 2 seconds. Neurological:      General: No focal deficit present. Mental Status: She is alert and oriented to person, place, and time. Psychiatric:         Mood and Affect: Mood normal.         Behavior: Behavior normal.         Diagnostic Study Results     Labs -  No results found for this or any previous visit (from the past 12 hour(s)). Radiologic Studies -   XR KNEE LEFT (3 VIEWS)   Final Result      Moderate knee effusion. Moderate osteoarthritic changes. No radiographic finding for acute fracture. XR TIBIA FIBULA LEFT (2 VIEWS)   Final Result      No radiographic finding for an acute osseous process. Moderate knee effusion. Distal leg/ankle soft tissue swelling. Medical Decision Making   I am the first provider for this patient. I reviewed the vital signs, available nursing notes, past medical history, past surgical history, family history and social history. Vital Signs-Reviewed the patient's vital signs. Records Reviewed: Old medical records. Nursing notes. (Time of Review: 9:06 AM)    ED Course: Progress Notes, Reevaluation, and Consults:    Provider Notes (Medical Decision Making):   MDM  Number of Diagnoses or Management Options  Knee effusion, left  Osteoarthritis of left knee, unspecified osteoarthritis type  Diagnosis management comments: The patient is a 68-year-old female with a history of tibial tendinitis, joint pain, presents emergency department with left knee pain that happened acutely while walking on Sunday.   Patient says she has a history of bad arthritis in the left knee and is followed with Dr. Paul Lugo her orthopedist.  Patient denies any surgical plan

## 2023-04-06 NOTE — ED NOTES
A0X4, responds to commands, VS completed, 100% on room air, pt reports being able to bear weight on left leg, call bell within reach, will continue to monitor.      Hiram Phelan RN  04/06/23 3890

## 2023-04-06 NOTE — DISCHARGE INSTRUCTIONS
Looks like you have some fluid and arthritis in your left knee and would like to follow-up with Dr. Amara Coronado like you were before to see if you need further injections or more interventions of the knee for your arthritis. We will give you some steroids, pain control, and make sure to return if you are at all worsened or concerned. If you develop a fever, increased pain, redness, please return immediately.

## 2023-10-27 ENCOUNTER — OFFICE VISIT (OUTPATIENT)
Age: 73
End: 2023-10-27
Payer: MEDICARE

## 2023-10-27 VITALS
WEIGHT: 221 LBS | DIASTOLIC BLOOD PRESSURE: 65 MMHG | SYSTOLIC BLOOD PRESSURE: 119 MMHG | BODY MASS INDEX: 35.52 KG/M2 | HEIGHT: 66 IN | HEART RATE: 71 BPM | OXYGEN SATURATION: 97 %

## 2023-10-27 DIAGNOSIS — R07.9 CHEST PAIN, UNSPECIFIED TYPE: Primary | ICD-10-CM

## 2023-10-27 DIAGNOSIS — R60.0 LOCALIZED EDEMA: ICD-10-CM

## 2023-10-27 DIAGNOSIS — E66.01 SEVERE OBESITY (BMI 35.0-39.9) WITH COMORBIDITY (HCC): ICD-10-CM

## 2023-10-27 PROCEDURE — 1090F PRES/ABSN URINE INCON ASSESS: CPT | Performed by: INTERNAL MEDICINE

## 2023-10-27 PROCEDURE — 99204 OFFICE O/P NEW MOD 45 MIN: CPT | Performed by: INTERNAL MEDICINE

## 2023-10-27 PROCEDURE — 1036F TOBACCO NON-USER: CPT | Performed by: INTERNAL MEDICINE

## 2023-10-27 PROCEDURE — G8484 FLU IMMUNIZE NO ADMIN: HCPCS | Performed by: INTERNAL MEDICINE

## 2023-10-27 PROCEDURE — 1123F ACP DISCUSS/DSCN MKR DOCD: CPT | Performed by: INTERNAL MEDICINE

## 2023-10-27 PROCEDURE — 3017F COLORECTAL CA SCREEN DOC REV: CPT | Performed by: INTERNAL MEDICINE

## 2023-10-27 PROCEDURE — G8427 DOCREV CUR MEDS BY ELIG CLIN: HCPCS | Performed by: INTERNAL MEDICINE

## 2023-10-27 PROCEDURE — G8400 PT W/DXA NO RESULTS DOC: HCPCS | Performed by: INTERNAL MEDICINE

## 2023-10-27 PROCEDURE — G8417 CALC BMI ABV UP PARAM F/U: HCPCS | Performed by: INTERNAL MEDICINE

## 2023-10-27 PROCEDURE — 93000 ELECTROCARDIOGRAM COMPLETE: CPT | Performed by: INTERNAL MEDICINE

## 2023-10-27 RX ORDER — NITROGLYCERIN 0.4 MG/1
0.4 TABLET SUBLINGUAL EVERY 5 MIN PRN
Status: SHIPPED | OUTPATIENT
Start: 2023-10-27

## 2023-10-27 RX ORDER — ASPIRIN 81 MG/1
81 TABLET ORAL DAILY
Qty: 100 TABLET | Status: SHIPPED | OUTPATIENT
Start: 2023-10-27

## 2023-10-27 ASSESSMENT — PATIENT HEALTH QUESTIONNAIRE - PHQ9
SUM OF ALL RESPONSES TO PHQ QUESTIONS 1-9: 0
SUM OF ALL RESPONSES TO PHQ QUESTIONS 1-9: 0
2. FEELING DOWN, DEPRESSED OR HOPELESS: 0
SUM OF ALL RESPONSES TO PHQ QUESTIONS 1-9: 0
DEPRESSION UNABLE TO ASSESS: FUNCTIONAL CAPACITY MOTIVATION LIMITS ACCURACY
1. LITTLE INTEREST OR PLEASURE IN DOING THINGS: 0
SUM OF ALL RESPONSES TO PHQ QUESTIONS 1-9: 0
SUM OF ALL RESPONSES TO PHQ9 QUESTIONS 1 & 2: 0

## 2023-10-27 ASSESSMENT — ENCOUNTER SYMPTOMS
EYES NEGATIVE: 1
RESPIRATORY NEGATIVE: 1
GASTROINTESTINAL NEGATIVE: 1

## 2023-10-27 NOTE — PROGRESS NOTES
Eleazar Quintana is a 68y.o. year old female. 10/27/2023 seen as a new patient for chest pain. Started 1 month ago, lasted 5 minutes approx, without radiation or associated SOB, nausea or sweating. Less severe episodes about 1/wk since. Edema in the ankles but more on the left for many years specially when she is traveling upright or not getting enough sleep. Denies dyspnea PND orthopnea dizziness or palpitations. Review of Systems   Constitutional: Negative. HENT: Negative. Eyes: Negative. Respiratory: Negative. Cardiovascular:  Positive for chest pain and leg swelling. Gastrointestinal: Negative. Endocrine: Negative. Genitourinary: Negative. Musculoskeletal: Negative. Neurological: Negative. Psychiatric/Behavioral: Negative. All other systems reviewed and are negative. Physical Exam  Vitals and nursing note reviewed. Constitutional:       Appearance: Normal appearance. She is obese. HENT:      Head: Normocephalic and atraumatic. Nose: Nose normal.   Eyes:      Conjunctiva/sclera: Conjunctivae normal.   Cardiovascular:      Rate and Rhythm: Normal rate and regular rhythm. Pulses: Normal pulses. Heart sounds: Normal heart sounds. Pulmonary:      Effort: Pulmonary effort is normal.      Breath sounds: Normal breath sounds. Chest:      Chest wall: Tenderness (Nonreproducible left parasternal) present. Abdominal:      General: Bowel sounds are normal.      Palpations: Abdomen is soft. Musculoskeletal:         General: Normal range of motion. Right lower leg: No edema. Left lower leg: Edema (trace) present. Skin:     General: Skin is warm and dry. Neurological:      General: No focal deficit present. Mental Status: She is alert and oriented to person, place, and time.    Psychiatric:         Mood and Affect: Mood normal.         Behavior: Behavior normal.        Allergies   Allergen Reactions    Codeine Itching

## 2025-01-11 ENCOUNTER — APPOINTMENT (OUTPATIENT)
Facility: HOSPITAL | Age: 75
End: 2025-01-11
Payer: MEDICARE

## 2025-01-11 ENCOUNTER — HOSPITAL ENCOUNTER (EMERGENCY)
Facility: HOSPITAL | Age: 75
Discharge: HOME | End: 2025-01-11
Attending: EMERGENCY MEDICINE
Payer: MEDICARE

## 2025-01-11 VITALS
RESPIRATION RATE: 18 BRPM | WEIGHT: 215 LBS | HEART RATE: 70 BPM | SYSTOLIC BLOOD PRESSURE: 132 MMHG | TEMPERATURE: 98 F | OXYGEN SATURATION: 98 % | DIASTOLIC BLOOD PRESSURE: 87 MMHG | BODY MASS INDEX: 34.7 KG/M2

## 2025-01-11 DIAGNOSIS — S83.402A SPRAIN OF COLLATERAL LIGAMENT OF LEFT KNEE, INITIAL ENCOUNTER: Primary | ICD-10-CM

## 2025-01-11 PROCEDURE — 6370000000 HC RX 637 (ALT 250 FOR IP): Performed by: EMERGENCY MEDICINE

## 2025-01-11 PROCEDURE — 99283 EMERGENCY DEPT VISIT LOW MDM: CPT

## 2025-01-11 PROCEDURE — 73562 X-RAY EXAM OF KNEE 3: CPT

## 2025-01-11 RX ORDER — TRAMADOL HYDROCHLORIDE 50 MG/1
50 TABLET ORAL EVERY 4 HOURS PRN
Qty: 18 TABLET | Refills: 0 | Status: SHIPPED | OUTPATIENT
Start: 2025-01-11 | End: 2025-01-14

## 2025-01-11 RX ORDER — TRAMADOL HYDROCHLORIDE 50 MG/1
50 TABLET ORAL
Status: COMPLETED | OUTPATIENT
Start: 2025-01-11 | End: 2025-01-11

## 2025-01-11 RX ADMIN — TRAMADOL HYDROCHLORIDE 50 MG: 50 TABLET, COATED ORAL at 23:34

## 2025-01-11 ASSESSMENT — PAIN DESCRIPTION - DESCRIPTORS
DESCRIPTORS: ACHING
DESCRIPTORS: ACHING

## 2025-01-11 ASSESSMENT — PAIN DESCRIPTION - FREQUENCY: FREQUENCY: CONTINUOUS

## 2025-01-11 ASSESSMENT — PAIN DESCRIPTION - LOCATION
LOCATION: KNEE
LOCATION: KNEE

## 2025-01-11 ASSESSMENT — ENCOUNTER SYMPTOMS: RESPIRATORY NEGATIVE: 1

## 2025-01-11 ASSESSMENT — PAIN DESCRIPTION - ORIENTATION: ORIENTATION: RIGHT

## 2025-01-11 ASSESSMENT — PAIN - FUNCTIONAL ASSESSMENT
PAIN_FUNCTIONAL_ASSESSMENT: 0-10
PAIN_FUNCTIONAL_ASSESSMENT: PREVENTS OR INTERFERES SOME ACTIVE ACTIVITIES AND ADLS

## 2025-01-11 ASSESSMENT — PAIN SCALES - GENERAL
PAINLEVEL_OUTOF10: 8
PAINLEVEL_OUTOF10: 10

## 2025-01-11 ASSESSMENT — LIFESTYLE VARIABLES
HOW OFTEN DO YOU HAVE A DRINK CONTAINING ALCOHOL: NEVER
HOW MANY STANDARD DRINKS CONTAINING ALCOHOL DO YOU HAVE ON A TYPICAL DAY: PATIENT DOES NOT DRINK

## 2025-01-11 ASSESSMENT — PAIN DESCRIPTION - PAIN TYPE: TYPE: ACUTE PAIN

## 2025-01-12 NOTE — ED PROVIDER NOTES
[facility] EMERGENCY DEPARTMENT  eMERGENCY dEPARTMENT eNCOUnter      Pt Name: Christie Merrill  MRN: [id number]  Birthdate 1950 of evaluation: 1/11/2025  Provider:Adalid Vaca MD    CHIEF COMPLAINT         HPI  Christie Merrill is a 74 y.o. female  c/o falling while walking down steps tonight.  She landed on her left knee.  She C/O having left knee pain.    ROS  Review of Systems   Respiratory: Negative.     Cardiovascular: Negative.    Musculoskeletal:  Positive for arthralgias (left knee).   All other systems reviewed and are negative.      Except as noted above the remainder of the review of systems was reviewed and negative.       PAST MEDICAL HISTORY     Past Medical History:   Diagnosis Date    Diverticulosis     Gallstones     Hip pain, left          SURGICAL HISTORY       Past Surgical History:   Procedure Laterality Date    CARDIAC CATHETERIZATION  3/23/2009    Normal per patient    CHOLECYSTECTOMY, LAPAROSCOPIC      COLONOSCOPY      HYSTERECTOMY (CERVIX STATUS UNKNOWN)  1997         CURRENTMEDICATIONS       Previous Medications    ELASTIC BANDAGES & SUPPORTS (MEDICAL COMPRESSION STOCKINGS) MISC    1 each by Does not apply route daily as needed (Remove at night) Knee-high, moderate compression    ESTRADIOL (ESTRACE) 0.1 MG/GM VAGINAL CREAM    Take a pea size amount and apply it to urethra three times a week for 2 weeks and then twice a week.    IBUPROFEN (ADVIL;MOTRIN) 600 MG TABLET    Take 1 tablet by mouth every 6 hours as needed    POLYETHYLENE GLYCOL-ELECTROLYTES (NULYTELY) 420 G SOLUTION    TAKE 4000 ML  by mouth as directed for 1 day       ALLERGIES     Codeine, Morphine, and Sulfa antibiotics    FAMILY HISTORY       Family History   Problem Relation Age of Onset    Cancer Father         stomach    Diabetes Father     Cancer Brother     Heart Attack Neg Hx     Stroke Neg Hx           SOCIAL HISTORY       Social History     Socioeconomic History    Marital status: Single

## 2025-01-12 NOTE — ED TRIAGE NOTES
Patient A/O x 4, presented to the ED with complaint of left knee pain after falling on steps and landing on knee x 2 hours ago.